# Patient Record
Sex: FEMALE | Race: WHITE | NOT HISPANIC OR LATINO | Employment: FULL TIME | URBAN - METROPOLITAN AREA
[De-identification: names, ages, dates, MRNs, and addresses within clinical notes are randomized per-mention and may not be internally consistent; named-entity substitution may affect disease eponyms.]

---

## 2017-04-10 ENCOUNTER — HOSPITAL ENCOUNTER (INPATIENT)
Facility: HOSPITAL | Age: 57
LOS: 2 days | Discharge: HOME | End: 2017-04-12
Attending: EMERGENCY MEDICINE | Admitting: INTERNAL MEDICINE

## 2017-04-10 ENCOUNTER — APPOINTMENT (OUTPATIENT)
Dept: GENERAL RADIOLOGY | Facility: HOSPITAL | Age: 57
End: 2017-04-10

## 2017-04-10 ENCOUNTER — APPOINTMENT (OUTPATIENT)
Dept: CT IMAGING | Facility: HOSPITAL | Age: 57
End: 2017-04-10

## 2017-04-10 DIAGNOSIS — F10.929 ALCOHOL INTOXICATION, WITH UNSPECIFIED COMPLICATION (HCC): ICD-10-CM

## 2017-04-10 DIAGNOSIS — T50.912A SUICIDE ATTEMPT BY MULTIPLE DRUG OVERDOSE, INITIAL ENCOUNTER (HCC): Primary | ICD-10-CM

## 2017-04-10 LAB
ALBUMIN SERPL-MCNC: 3.9 G/DL (ref 3.5–5.2)
ALBUMIN SERPL-MCNC: 3.9 G/DL (ref 3.5–5.2)
ALBUMIN/GLOB SERPL: 1.4 G/DL
ALBUMIN/GLOB SERPL: 1.4 G/DL
ALP SERPL-CCNC: 66 U/L (ref 39–117)
ALP SERPL-CCNC: 82 U/L (ref 39–117)
ALT SERPL W P-5'-P-CCNC: 27 U/L (ref 1–33)
ALT SERPL W P-5'-P-CCNC: 28 U/L (ref 1–33)
AMPHET+METHAMPHET UR QL: NEGATIVE
ANION GAP SERPL CALCULATED.3IONS-SCNC: 13.3 MMOL/L
ANION GAP SERPL CALCULATED.3IONS-SCNC: 7.4 MMOL/L
ANION GAP SERPL CALCULATED.3IONS-SCNC: 7.6 MMOL/L
APAP SERPL-MCNC: <5 MCG/ML (ref 10–30)
APTT PPP: 26.8 SECONDS (ref 22.7–35.4)
AST SERPL-CCNC: 27 U/L (ref 1–32)
AST SERPL-CCNC: 28 U/L (ref 1–32)
BACTERIA UR QL AUTO: ABNORMAL /HPF
BARBITURATES UR QL SCN: NEGATIVE
BASOPHILS # BLD AUTO: 0.01 10*3/MM3 (ref 0–0.2)
BASOPHILS # BLD AUTO: 0.03 10*3/MM3 (ref 0–0.2)
BASOPHILS NFR BLD AUTO: 0.2 % (ref 0–1.5)
BASOPHILS NFR BLD AUTO: 0.5 % (ref 0–1.5)
BENZODIAZ UR QL SCN: POSITIVE
BILIRUB SERPL-MCNC: 0.2 MG/DL (ref 0.1–1.2)
BILIRUB SERPL-MCNC: <0.2 MG/DL (ref 0.1–1.2)
BILIRUB UR QL STRIP: NEGATIVE
BUN BLD-MCNC: 15 MG/DL (ref 6–20)
BUN BLD-MCNC: 17 MG/DL (ref 6–20)
BUN BLD-MCNC: 20 MG/DL (ref 6–20)
BUN/CREAT SERPL: 20.3 (ref 7–25)
BUN/CREAT SERPL: 22.4 (ref 7–25)
BUN/CREAT SERPL: 27.4 (ref 7–25)
CALCIUM SPEC-SCNC: 8.5 MG/DL (ref 8.6–10.5)
CALCIUM SPEC-SCNC: 8.8 MG/DL (ref 8.6–10.5)
CALCIUM SPEC-SCNC: 9.3 MG/DL (ref 8.6–10.5)
CANNABINOIDS SERPL QL: NEGATIVE
CHLORIDE SERPL-SCNC: 100 MMOL/L (ref 98–107)
CHLORIDE SERPL-SCNC: 107 MMOL/L (ref 98–107)
CHLORIDE SERPL-SCNC: 108 MMOL/L (ref 98–107)
CLARITY UR: CLEAR
CO2 SERPL-SCNC: 26.7 MMOL/L (ref 22–29)
CO2 SERPL-SCNC: 29.4 MMOL/L (ref 22–29)
CO2 SERPL-SCNC: 29.6 MMOL/L (ref 22–29)
COCAINE UR QL: NEGATIVE
COLOR UR: YELLOW
CREAT BLD-MCNC: 0.73 MG/DL (ref 0.57–1)
CREAT BLD-MCNC: 0.74 MG/DL (ref 0.57–1)
CREAT BLD-MCNC: 0.76 MG/DL (ref 0.57–1)
DEPRECATED RDW RBC AUTO: 41.8 FL (ref 37–54)
DEPRECATED RDW RBC AUTO: 42.9 FL (ref 37–54)
EOSINOPHIL # BLD AUTO: 0.23 10*3/MM3 (ref 0–0.7)
EOSINOPHIL # BLD AUTO: 0.38 10*3/MM3 (ref 0–0.7)
EOSINOPHIL NFR BLD AUTO: 5.5 % (ref 0.3–6.2)
EOSINOPHIL NFR BLD AUTO: 6.3 % (ref 0.3–6.2)
ERYTHROCYTE [DISTWIDTH] IN BLOOD BY AUTOMATED COUNT: 12.3 % (ref 11.7–13)
ERYTHROCYTE [DISTWIDTH] IN BLOOD BY AUTOMATED COUNT: 12.5 % (ref 11.7–13)
ETHANOL BLD-MCNC: 113 MG/DL (ref 0–10)
ETHANOL UR QL: 0.11 %
GFR SERPL CREATININE-BSD FRML MDRD: 78 ML/MIN/1.73
GFR SERPL CREATININE-BSD FRML MDRD: 80 ML/MIN/1.73
GFR SERPL CREATININE-BSD FRML MDRD: 82 ML/MIN/1.73
GLOBULIN UR ELPH-MCNC: 2.8 GM/DL
GLOBULIN UR ELPH-MCNC: 2.8 GM/DL
GLUCOSE BLD-MCNC: 110 MG/DL (ref 65–99)
GLUCOSE BLD-MCNC: 84 MG/DL (ref 65–99)
GLUCOSE BLD-MCNC: 94 MG/DL (ref 65–99)
GLUCOSE BLDC GLUCOMTR-MCNC: 65 MG/DL (ref 70–130)
GLUCOSE BLDC GLUCOMTR-MCNC: 72 MG/DL (ref 70–130)
GLUCOSE BLDC GLUCOMTR-MCNC: 74 MG/DL (ref 70–130)
GLUCOSE BLDC GLUCOMTR-MCNC: 78 MG/DL (ref 70–130)
GLUCOSE BLDC GLUCOMTR-MCNC: 79 MG/DL (ref 70–130)
GLUCOSE BLDC GLUCOMTR-MCNC: 84 MG/DL (ref 70–130)
GLUCOSE BLDC GLUCOMTR-MCNC: 89 MG/DL (ref 70–130)
GLUCOSE BLDC GLUCOMTR-MCNC: 93 MG/DL (ref 70–130)
GLUCOSE UR STRIP-MCNC: NEGATIVE MG/DL
HCT VFR BLD AUTO: 40.4 % (ref 35.6–45.5)
HCT VFR BLD AUTO: 40.7 % (ref 35.6–45.5)
HGB BLD-MCNC: 12.9 G/DL (ref 11.9–15.5)
HGB BLD-MCNC: 13 G/DL (ref 11.9–15.5)
HGB UR QL STRIP.AUTO: NEGATIVE
HYALINE CASTS UR QL AUTO: ABNORMAL /LPF
IMM GRANULOCYTES # BLD: 0 10*3/MM3 (ref 0–0.03)
IMM GRANULOCYTES # BLD: 0 10*3/MM3 (ref 0–0.03)
IMM GRANULOCYTES NFR BLD: 0 % (ref 0–0.5)
IMM GRANULOCYTES NFR BLD: 0 % (ref 0–0.5)
INR PPP: 0.95 (ref 0.9–1.1)
KETONES UR QL STRIP: NEGATIVE
LEUKOCYTE ESTERASE UR QL STRIP.AUTO: ABNORMAL
LITHIUM SERPL-SCNC: 0.7 MMOL/L (ref 0.6–1.2)
LITHIUM SERPL-SCNC: 0.9 MMOL/L (ref 0.6–1.2)
LITHIUM SERPL-SCNC: 1.1 MMOL/L (ref 0.6–1.2)
LITHIUM SERPL-SCNC: 1.4 MMOL/L (ref 0.6–1.2)
LITHIUM SERPL-SCNC: 1.4 MMOL/L (ref 0.6–1.2)
LYMPHOCYTES # BLD AUTO: 1.47 10*3/MM3 (ref 0.9–4.8)
LYMPHOCYTES # BLD AUTO: 2.41 10*3/MM3 (ref 0.9–4.8)
LYMPHOCYTES NFR BLD AUTO: 34.8 % (ref 19.6–45.3)
LYMPHOCYTES NFR BLD AUTO: 40 % (ref 19.6–45.3)
MAGNESIUM SERPL-MCNC: 2.1 MG/DL (ref 1.6–2.6)
MAGNESIUM SERPL-MCNC: 2.1 MG/DL (ref 1.6–2.6)
MCH RBC QN AUTO: 29.9 PG (ref 26.9–32)
MCH RBC QN AUTO: 30.2 PG (ref 26.9–32)
MCHC RBC AUTO-ENTMCNC: 31.7 G/DL (ref 32.4–36.3)
MCHC RBC AUTO-ENTMCNC: 32.2 G/DL (ref 32.4–36.3)
MCV RBC AUTO: 93.7 FL (ref 80.5–98.2)
MCV RBC AUTO: 94.2 FL (ref 80.5–98.2)
METHADONE UR QL SCN: NEGATIVE
MONOCYTES # BLD AUTO: 0.31 10*3/MM3 (ref 0.2–1.2)
MONOCYTES # BLD AUTO: 0.68 10*3/MM3 (ref 0.2–1.2)
MONOCYTES NFR BLD AUTO: 11.3 % (ref 5–12)
MONOCYTES NFR BLD AUTO: 7.3 % (ref 5–12)
NEUTROPHILS # BLD AUTO: 2.2 10*3/MM3 (ref 1.9–8.1)
NEUTROPHILS # BLD AUTO: 2.53 10*3/MM3 (ref 1.9–8.1)
NEUTROPHILS NFR BLD AUTO: 41.9 % (ref 42.7–76)
NEUTROPHILS NFR BLD AUTO: 52.2 % (ref 42.7–76)
NITRITE UR QL STRIP: NEGATIVE
OPIATES UR QL: NEGATIVE
OXYCODONE UR QL SCN: NEGATIVE
PH UR STRIP.AUTO: 7 [PH] (ref 5–8)
PHOSPHATE SERPL-MCNC: 2.8 MG/DL (ref 2.5–4.5)
PLATELET # BLD AUTO: 194 10*3/MM3 (ref 140–500)
PLATELET # BLD AUTO: 209 10*3/MM3 (ref 140–500)
PMV BLD AUTO: 10.2 FL (ref 6–12)
PMV BLD AUTO: 10.4 FL (ref 6–12)
POTASSIUM BLD-SCNC: 3.6 MMOL/L (ref 3.5–5.2)
POTASSIUM BLD-SCNC: 4.2 MMOL/L (ref 3.5–5.2)
POTASSIUM BLD-SCNC: 4.4 MMOL/L (ref 3.5–5.2)
PROT SERPL-MCNC: 6.7 G/DL (ref 6–8.5)
PROT SERPL-MCNC: 6.7 G/DL (ref 6–8.5)
PROT UR QL STRIP: NEGATIVE
PROTHROMBIN TIME: 12.3 SECONDS (ref 11.7–14.2)
RBC # BLD AUTO: 4.31 10*6/MM3 (ref 3.9–5.2)
RBC # BLD AUTO: 4.32 10*6/MM3 (ref 3.9–5.2)
RBC # UR: ABNORMAL /HPF
REF LAB TEST METHOD: ABNORMAL
SALICYLATES SERPL-MCNC: <0.3 MG/DL
SODIUM BLD-SCNC: 140 MMOL/L (ref 136–145)
SODIUM BLD-SCNC: 144 MMOL/L (ref 136–145)
SODIUM BLD-SCNC: 145 MMOL/L (ref 136–145)
SP GR UR STRIP: 1.01 (ref 1–1.03)
SQUAMOUS #/AREA URNS HPF: ABNORMAL /HPF
UROBILINOGEN UR QL STRIP: ABNORMAL
WBC NRBC COR # BLD: 4.22 10*3/MM3 (ref 4.5–10.7)
WBC NRBC COR # BLD: 6.03 10*3/MM3 (ref 4.5–10.7)
WBC UR QL AUTO: ABNORMAL /HPF

## 2017-04-10 PROCEDURE — 80307 DRUG TEST PRSMV CHEM ANLYZR: CPT | Performed by: PHYSICIAN ASSISTANT

## 2017-04-10 PROCEDURE — 25010000002 ONDANSETRON PER 1 MG: Performed by: INTERNAL MEDICINE

## 2017-04-10 PROCEDURE — 83735 ASSAY OF MAGNESIUM: CPT | Performed by: PHYSICIAN ASSISTANT

## 2017-04-10 PROCEDURE — 0HQ0XZZ REPAIR SCALP SKIN, EXTERNAL APPROACH: ICD-10-PCS | Performed by: PHYSICIAN ASSISTANT

## 2017-04-10 PROCEDURE — 85025 COMPLETE CBC W/AUTO DIFF WBC: CPT | Performed by: PHYSICIAN ASSISTANT

## 2017-04-10 PROCEDURE — 87086 URINE CULTURE/COLONY COUNT: CPT | Performed by: PHYSICIAN ASSISTANT

## 2017-04-10 PROCEDURE — 72050 X-RAY EXAM NECK SPINE 4/5VWS: CPT

## 2017-04-10 PROCEDURE — 80178 ASSAY OF LITHIUM: CPT | Performed by: EMERGENCY MEDICINE

## 2017-04-10 PROCEDURE — 93005 ELECTROCARDIOGRAM TRACING: CPT | Performed by: PHYSICIAN ASSISTANT

## 2017-04-10 PROCEDURE — 80053 COMPREHEN METABOLIC PANEL: CPT | Performed by: PHYSICIAN ASSISTANT

## 2017-04-10 PROCEDURE — 80053 COMPREHEN METABOLIC PANEL: CPT | Performed by: INTERNAL MEDICINE

## 2017-04-10 PROCEDURE — 85730 THROMBOPLASTIN TIME PARTIAL: CPT | Performed by: PHYSICIAN ASSISTANT

## 2017-04-10 PROCEDURE — 71020 HC CHEST PA AND LATERAL: CPT

## 2017-04-10 PROCEDURE — 80178 ASSAY OF LITHIUM: CPT | Performed by: PHYSICIAN ASSISTANT

## 2017-04-10 PROCEDURE — 85610 PROTHROMBIN TIME: CPT | Performed by: PHYSICIAN ASSISTANT

## 2017-04-10 PROCEDURE — 99285 EMERGENCY DEPT VISIT HI MDM: CPT

## 2017-04-10 PROCEDURE — 90791 PSYCH DIAGNOSTIC EVALUATION: CPT

## 2017-04-10 PROCEDURE — 81001 URINALYSIS AUTO W/SCOPE: CPT | Performed by: PHYSICIAN ASSISTANT

## 2017-04-10 PROCEDURE — 85025 COMPLETE CBC W/AUTO DIFF WBC: CPT | Performed by: INTERNAL MEDICINE

## 2017-04-10 PROCEDURE — 70450 CT HEAD/BRAIN W/O DYE: CPT

## 2017-04-10 PROCEDURE — 83735 ASSAY OF MAGNESIUM: CPT | Performed by: INTERNAL MEDICINE

## 2017-04-10 PROCEDURE — 82962 GLUCOSE BLOOD TEST: CPT

## 2017-04-10 PROCEDURE — 93010 ELECTROCARDIOGRAM REPORT: CPT | Performed by: INTERNAL MEDICINE

## 2017-04-10 PROCEDURE — 84100 ASSAY OF PHOSPHORUS: CPT | Performed by: INTERNAL MEDICINE

## 2017-04-10 RX ORDER — ACETAMINOPHEN 325 MG/1
650 TABLET ORAL EVERY 4 HOURS PRN
Status: DISCONTINUED | OUTPATIENT
Start: 2017-04-10 | End: 2017-04-12 | Stop reason: HOSPADM

## 2017-04-10 RX ORDER — TRAMADOL HYDROCHLORIDE 50 MG/1
50 TABLET ORAL EVERY 6 HOURS PRN
Status: DISCONTINUED | OUTPATIENT
Start: 2017-04-10 | End: 2017-04-12 | Stop reason: HOSPADM

## 2017-04-10 RX ORDER — POLYETHYLENE GLYCOL 3350, SODIUM CHLORIDE, POTASSIUM CHLORIDE, SODIUM BICARBONATE, AND SODIUM SULFATE 240; 5.84; 2.98; 6.72; 22.72 G/4L; G/4L; G/4L; G/4L; G/4L
1000 POWDER, FOR SOLUTION ORAL
Status: COMPLETED | OUTPATIENT
Start: 2017-04-10 | End: 2017-04-10

## 2017-04-10 RX ORDER — MORPHINE SULFATE 2 MG/ML
1 INJECTION, SOLUTION INTRAMUSCULAR; INTRAVENOUS EVERY 4 HOURS PRN
Status: DISCONTINUED | OUTPATIENT
Start: 2017-04-10 | End: 2017-04-12 | Stop reason: HOSPADM

## 2017-04-10 RX ORDER — POTASSIUM CHLORIDE 7.45 MG/ML
10 INJECTION INTRAVENOUS
Status: DISCONTINUED | OUTPATIENT
Start: 2017-04-10 | End: 2017-04-12 | Stop reason: HOSPADM

## 2017-04-10 RX ORDER — NITROGLYCERIN 0.4 MG/1
0.4 TABLET SUBLINGUAL
Status: DISCONTINUED | OUTPATIENT
Start: 2017-04-10 | End: 2017-04-12 | Stop reason: HOSPADM

## 2017-04-10 RX ORDER — DEXTROSE MONOHYDRATE 25 G/50ML
25 INJECTION, SOLUTION INTRAVENOUS
Status: DISCONTINUED | OUTPATIENT
Start: 2017-04-10 | End: 2017-04-12 | Stop reason: HOSPADM

## 2017-04-10 RX ORDER — NALOXONE HCL 0.4 MG/ML
0.4 VIAL (ML) INJECTION
Status: DISCONTINUED | OUTPATIENT
Start: 2017-04-10 | End: 2017-04-12 | Stop reason: HOSPADM

## 2017-04-10 RX ORDER — SODIUM CHLORIDE 9 MG/ML
175 INJECTION, SOLUTION INTRAVENOUS CONTINUOUS
Status: DISCONTINUED | OUTPATIENT
Start: 2017-04-10 | End: 2017-04-11

## 2017-04-10 RX ORDER — SODIUM CHLORIDE 0.9 % (FLUSH) 0.9 %
1-10 SYRINGE (ML) INJECTION AS NEEDED
Status: DISCONTINUED | OUTPATIENT
Start: 2017-04-10 | End: 2017-04-12 | Stop reason: HOSPADM

## 2017-04-10 RX ORDER — LITHIUM CARBONATE 150 MG/1
150 CAPSULE ORAL 2 TIMES DAILY WITH MEALS
COMMUNITY
End: 2017-04-12 | Stop reason: HOSPADM

## 2017-04-10 RX ORDER — SENNA AND DOCUSATE SODIUM 50; 8.6 MG/1; MG/1
2 TABLET, FILM COATED ORAL 2 TIMES DAILY PRN
Status: DISCONTINUED | OUTPATIENT
Start: 2017-04-10 | End: 2017-04-12 | Stop reason: HOSPADM

## 2017-04-10 RX ORDER — ONDANSETRON 2 MG/ML
4 INJECTION INTRAMUSCULAR; INTRAVENOUS EVERY 6 HOURS PRN
Status: DISCONTINUED | OUTPATIENT
Start: 2017-04-10 | End: 2017-04-12 | Stop reason: HOSPADM

## 2017-04-10 RX ORDER — IPRATROPIUM BROMIDE AND ALBUTEROL SULFATE 2.5; .5 MG/3ML; MG/3ML
3 SOLUTION RESPIRATORY (INHALATION)
Status: DISCONTINUED | OUTPATIENT
Start: 2017-04-10 | End: 2017-04-12 | Stop reason: HOSPADM

## 2017-04-10 RX ORDER — POTASSIUM CHLORIDE 750 MG/1
40 CAPSULE, EXTENDED RELEASE ORAL AS NEEDED
Status: DISCONTINUED | OUTPATIENT
Start: 2017-04-10 | End: 2017-04-12 | Stop reason: HOSPADM

## 2017-04-10 RX ORDER — POTASSIUM CHLORIDE 1.5 G/1.77G
40 POWDER, FOR SOLUTION ORAL AS NEEDED
Status: DISCONTINUED | OUTPATIENT
Start: 2017-04-10 | End: 2017-04-10 | Stop reason: CLARIF

## 2017-04-10 RX ORDER — CLONAZEPAM 1 MG/1
1 TABLET ORAL
COMMUNITY
End: 2017-04-12 | Stop reason: HOSPADM

## 2017-04-10 RX ORDER — SODIUM CHLORIDE 0.9 % (FLUSH) 0.9 %
10 SYRINGE (ML) INJECTION AS NEEDED
Status: DISCONTINUED | OUTPATIENT
Start: 2017-04-10 | End: 2017-04-12 | Stop reason: HOSPADM

## 2017-04-10 RX ORDER — NICOTINE POLACRILEX 4 MG
15 LOZENGE BUCCAL
Status: DISCONTINUED | OUTPATIENT
Start: 2017-04-10 | End: 2017-04-12 | Stop reason: HOSPADM

## 2017-04-10 RX ORDER — SENNA AND DOCUSATE SODIUM 50; 8.6 MG/1; MG/1
2 TABLET, FILM COATED ORAL NIGHTLY
Status: DISCONTINUED | OUTPATIENT
Start: 2017-04-10 | End: 2017-04-12 | Stop reason: HOSPADM

## 2017-04-10 RX ADMIN — TRAMADOL HYDROCHLORIDE 50 MG: 50 TABLET, COATED ORAL at 16:58

## 2017-04-10 RX ADMIN — POLYETHYLENE GLYCOL 3350, SODIUM CHLORIDE, POTASSIUM CHLORIDE, SODIUM BICARBONATE, AND SODIUM SULFATE 1000 ML: 240; 5.84; 2.98; 6.72; 22.72 POWDER, FOR SOLUTION ORAL at 04:45

## 2017-04-10 RX ADMIN — POLYETHYLENE GLYCOL 3350, SODIUM CHLORIDE, POTASSIUM CHLORIDE, SODIUM BICARBONATE, AND SODIUM SULFATE 1000 ML: 240; 5.84; 2.98; 6.72; 22.72 POWDER, FOR SOLUTION ORAL at 03:30

## 2017-04-10 RX ADMIN — POLYETHYLENE GLYCOL 3350, SODIUM CHLORIDE, POTASSIUM CHLORIDE, SODIUM BICARBONATE, AND SODIUM SULFATE 1000 ML: 240; 5.84; 2.98; 6.72; 22.72 POWDER, FOR SOLUTION ORAL at 02:30

## 2017-04-10 RX ADMIN — POLYETHYLENE GLYCOL 3350, SODIUM CHLORIDE, POTASSIUM CHLORIDE, SODIUM BICARBONATE, AND SODIUM SULFATE 1000 ML: 240; 5.84; 2.98; 6.72; 22.72 POWDER, FOR SOLUTION ORAL at 06:26

## 2017-04-10 RX ADMIN — SODIUM CHLORIDE 175 ML/HR: 9 INJECTION, SOLUTION INTRAVENOUS at 05:28

## 2017-04-10 RX ADMIN — SODIUM CHLORIDE 175 ML/HR: 9 INJECTION, SOLUTION INTRAVENOUS at 12:04

## 2017-04-10 RX ADMIN — ACETAMINOPHEN 650 MG: 325 TABLET ORAL at 18:32

## 2017-04-10 RX ADMIN — ONDANSETRON 4 MG: 2 INJECTION, SOLUTION INTRAMUSCULAR; INTRAVENOUS at 05:28

## 2017-04-10 RX ADMIN — SODIUM CHLORIDE 1000 ML: 9 INJECTION, SOLUTION INTRAVENOUS at 10:56

## 2017-04-10 RX ADMIN — SODIUM CHLORIDE 175 ML/HR: 9 INJECTION, SOLUTION INTRAVENOUS at 21:08

## 2017-04-10 RX ADMIN — SODIUM CHLORIDE 175 ML/HR: 9 INJECTION, SOLUTION INTRAVENOUS at 18:25

## 2017-04-10 RX ADMIN — SODIUM CHLORIDE 1000 ML: 9 INJECTION, SOLUTION INTRAVENOUS at 02:10

## 2017-04-10 RX ADMIN — DEXTROSE MONOHYDRATE 25 G: 25 INJECTION, SOLUTION INTRAVENOUS at 12:31

## 2017-04-10 NOTE — ED PROVIDER NOTES
I supervised care provided by the midlevel provider.    We have discussed this patient's history, physical exam, and treatment plan.   I have reviewed the note and personally saw and examined the patient and agree with the plan of care.      Pt reports that at about 23:00 tonight, pt attempted to overdose on her own prescribed medications as she has recently been depressed. Pt states that she took Clonazepam 1 mg (40 pills) and Lithium 150 mg (20 capsules) with ETOH. Pt then called the police after ingestion and reports that when she opened the door, she fell, sustaining injury to the head. She denies LOC.     On physical exam, pt is oriented. She is sleepy, but rouses easily to verbal stimuli..She is able to answer most questions appropriately.   Pupils are round and reactive to light bilaterally.  Pt with kerlex bandage over 3cm scalp wound with good hemostasis  Lungs good air movement bilat,  Abdomen nonTTPalp        EKG:      EKG time: 02:57 AM  Rhythm/Rate: NSR rate 60s  P waves and HI: Normal P waves. Normal GERSON  QRS, axis: Normal QRS   ST and T waves: Normal ST  QT is normal     Interpreted Contemporaneously by me, independently viewed  No old EKG available for comparison          LABS:  Lab Results (last 24 hours)     Procedure Component Value Units Date/Time    CBC & Differential [[id number]] Collected:  04/10/17 0209    Specimen:  Blood Updated:  04/10/17 0225    Narrative:       The following orders were created for panel order CBC & Differential.  Procedure                               Abnormality         Status                     ---------                               -----------         ------                     CBC Auto Differential[[id number]]         Abnormal            Final result                 Please view results for these tests on the individual orders.    Comprehensive Metabolic Panel [[id number]]  (Abnormal) Collected:  04/10/17 0209    Specimen:  Blood Updated:  04/10/17 0255     Glucose 110  (H) mg/dL      BUN 20 mg/dL      Creatinine 0.73 mg/dL      Sodium 140 mmol/L      Potassium 3.6 mmol/L      Chloride 100 mmol/L      CO2 26.7 mmol/L      Calcium 9.3 mg/dL      Total Protein 6.7 g/dL      Albumin 3.90 g/dL      ALT (SGPT) 28 U/L      AST (SGOT) 28 U/L      Alkaline Phosphatase 82 U/L      Total Bilirubin <0.2 mg/dL      eGFR Non African Amer 82 mL/min/1.73      Globulin 2.8 gm/dL      A/G Ratio 1.4 g/dL      BUN/Creatinine Ratio 27.4 (H)     Anion Gap 13.3 mmol/L     Protime-INR [[id number]]  (Normal) Collected:  04/10/17 0209    Specimen:  Blood Updated:  04/10/17 0235     Protime 12.3 Seconds      INR 0.95    aPTT [[id number]]  (Normal) Collected:  04/10/17 0209    Specimen:  Blood Updated:  04/10/17 0235     PTT 26.8 seconds     Ethanol [[id number]]  (Abnormal) Collected:  04/10/17 0209    Specimen:  Blood Updated:  04/10/17 0247     Ethanol 113 (H) mg/dL      Ethanol % 0.113 %     Magnesium [[id number]]  (Normal) Collected:  04/10/17 0209    Specimen:  Blood Updated:  04/10/17 0247     Magnesium 2.1 mg/dL     Acetaminophen Level [[id number]]  (Abnormal) Collected:  04/10/17 0209    Specimen:  Blood Updated:  04/10/17 0247     Acetaminophen <5.0 (L) mcg/mL     Salicylate Level [[id number]] Collected:  04/10/17 0209    Specimen:  Blood Updated:  04/10/17 0247     Salicylate <0.3 mg/dL     Narrative:       Therapeutic range for Salicylates:  3.0 - 10.0 mg/dL for antipyretic/analgesic conditions  15.0 - 30.0 mg/dL for anti-inflammatory conditions    Lithium Level [[id number]]  (Normal) Collected:  04/10/17 0209    Specimen:  Blood Updated:  04/10/17 0244     Lithium 0.7 mmol/L     CBC Auto Differential [[id number]]  (Abnormal) Collected:  04/10/17 0209    Specimen:  Blood Updated:  04/10/17 0225     WBC 6.03 10*3/mm3      RBC 4.31 10*6/mm3      Hemoglobin 13.0 g/dL      Hematocrit 40.4 %      MCV 93.7 fL      MCH 30.2 pg      MCHC 32.2 (L) g/dL      RDW 12.3 %      RDW-SD 41.8 fl      MPV 10.2 fL      Platelets  209 10*3/mm3      Neutrophil % 41.9 (L) %      Lymphocyte % 40.0 %      Monocyte % 11.3 %      Eosinophil % 6.3 (H) %      Basophil % 0.5 %      Immature Grans % 0.0 %      Neutrophils, Absolute 2.53 10*3/mm3      Lymphocytes, Absolute 2.41 10*3/mm3      Monocytes, Absolute 0.68 10*3/mm3      Eosinophils, Absolute 0.38 10*3/mm3      Basophils, Absolute 0.03 10*3/mm3      Immature Grans, Absolute 0.00 10*3/mm3     Lithium Level [[id number]]  (Abnormal) Collected:  04/10/17 0406    Specimen:  Blood Updated:  04/10/17 0504     Lithium 1.4 (H) mmol/L     Urinalysis With / Culture If Indicated [[id number]]  (Abnormal) Collected:  04/10/17 0410    Specimen:  Urine from Urine, Clean Catch Updated:  04/10/17 0429     Color, UA Yellow     Appearance, UA Clear     pH, UA 7.0     Specific Gravity, UA 1.008     Glucose, UA Negative     Ketones, UA Negative     Bilirubin, UA Negative     Blood, UA Negative     Protein, UA Negative     Leuk Esterase, UA Small (1+) (A)     Nitrite, UA Negative     Urobilinogen, UA 0.2 E.U./dL    Urine Drug Screen [[id number]]  (Abnormal) Collected:  04/10/17 0410    Specimen:  Urine from Urine, Clean Catch Updated:  04/10/17 0449     Amphet/Methamphet, Screen Negative     Barbiturates Screen, Urine Negative     Benzodiazepine Screen, Urine Positive (A)     Cocaine Screen, Urine Negative     Opiate Screen Negative     THC, Screen, Urine Negative     Methadone Screen, Urine Negative     Oxycodone Screen, Urine Negative    Narrative:       Negative Thresholds For Drugs Screened:     Amphetamines               500 ng/ml   Barbiturates               200 ng/ml   Benzodiazepines            100 ng/ml   Cocaine                    300 ng/ml   Methadone                  300 ng/ml   Opiates                    300 ng/ml   Oxycodone                  100 ng/ml   THC                        50 ng/ml    The Normal Value for all drugs tested is negative. This report includes final unconfirmed screening results to be used  for medical treatment purposes only. Unconfirmed results must not be used for non-medical purposes such as employment or legal testing. Clinical consideration should be applied to any drug of abuse test, particulary when unconfirmed results are used.    Urinalysis, Microscopic Only [[id number]]  (Abnormal) Collected:  04/10/17 0410    Specimen:  Urine from Urine, Clean Catch Updated:  04/10/17 0429     RBC, UA 0-2 /HPF      WBC, UA 3-5 (A) /HPF      Bacteria, UA None Seen /HPF      Squamous Epithelial Cells, UA 0-2 /HPF      Hyaline Casts, UA 0-2 /LPF      Methodology Automated Microscopy    Urine Culture [[id number]] Collected:  04/10/17 0410    Specimen:  Urine from Urine, Clean Catch Updated:  04/10/17 0425    POC Glucose Fingerstick [[id number]]  (Normal) Collected:  04/10/17 0619    Specimen:  Blood Updated:  04/10/17 0621     Glucose 89 mg/dL     Narrative:       Meter: [id number] : [id number]    Lithium Level [[id number]]  (Abnormal) Collected:  04/10/17 0623    Specimen:  Blood Updated:  04/10/17 0703     Lithium 1.4 (H) mmol/L     CBC & Differential [[id number]] Collected:  04/10/17 0623    Specimen:  Blood Updated:  04/10/17 0652    Narrative:       The following orders were created for panel order CBC & Differential.  Procedure                               Abnormality         Status                     ---------                               -----------         ------                     CBC Auto Differential[[id number]]         Abnormal            Final result                 Please view results for these tests on the individual orders.    Comprehensive Metabolic Panel [[id number]]  (Abnormal) Collected:  04/10/17 0623    Specimen:  Blood Updated:  04/10/17 0710     Glucose 94 mg/dL      BUN 17 mg/dL      Creatinine 0.76 mg/dL      Sodium 144 mmol/L      Potassium 4.2 mmol/L      Chloride 107 mmol/L      CO2 29.6 (H) mmol/L      Calcium 8.8 mg/dL      Total Protein 6.7 g/dL      Albumin 3.90 g/dL      ALT (SGPT)  27 U/L      AST (SGOT) 27 U/L      Alkaline Phosphatase 66 U/L      Total Bilirubin 0.2 mg/dL      eGFR Non African Amer 78 mL/min/1.73      Globulin 2.8 gm/dL      A/G Ratio 1.4 g/dL      BUN/Creatinine Ratio 22.4     Anion Gap 7.4 mmol/L     CBC Auto Differential [[id number]]  (Abnormal) Collected:  04/10/17 0623    Specimen:  Blood Updated:  04/10/17 0652     WBC 4.22 (L) 10*3/mm3      RBC 4.32 10*6/mm3      Hemoglobin 12.9 g/dL      Hematocrit 40.7 %      MCV 94.2 fL      MCH 29.9 pg      MCHC 31.7 (L) g/dL      RDW 12.5 %      RDW-SD 42.9 fl      MPV 10.4 fL      Platelets 194 10*3/mm3      Neutrophil % 52.2 %      Lymphocyte % 34.8 %      Monocyte % 7.3 %      Eosinophil % 5.5 %      Basophil % 0.2 %      Immature Grans % 0.0 %      Neutrophils, Absolute 2.20 10*3/mm3      Lymphocytes, Absolute 1.47 10*3/mm3      Monocytes, Absolute 0.31 10*3/mm3      Eosinophils, Absolute 0.23 10*3/mm3      Basophils, Absolute 0.01 10*3/mm3      Immature Grans, Absolute 0.00 10*3/mm3                     PROGRESS NOTES:    1:39 AM: Discussed case with Poison Control. She states that symptoms for toxic levels of lithium include confusion, somnolence, tremor, respiratory depression, nausea, and vomiting. Cardiac symptoms include low BP, V-tach, and increased HR. It is not affected by charcoal, thus, whole-bowel irrigation is recommended. If toxic levels are reached, dialysis is recommended. It is also recommended that pt's lithium levels are checked every 2 hours until the level decreases.     1:58 AM: Pt is receiving IV fluids. She will have NG tube placed (golytely ordered). Pt will also be placed on a 72-hour hold.     3:01 AM: Rechecked pt. Pt has NG tube in place, which she is tolerating well. On re-evaluation, pt is arousable to verbal stimuli. O2 sat is 98% on room air. BP is 114/81. HR is in the 70s.     3:10 AM: Discussed case with Dr. Wood Cheung, intensivist. He will admit pt to the ICU. Head laceration was repaired by  Mr. Alfredo PA-C. Pt's CT Head is negative acute.         DISPOSITION: Pt admitted to the ICU.          Final diagnoses:   Suicide attempt by multiple drug overdose, initial encounter   Alcohol intoxication, with unspecified complication   fall  Scalp laceration    Documentation assistance provided by Aiden Jacobo. Information recorded by the scribe was done at my direction and has been verified and validated by me.     Entered by Aiden Jacobo, acting as scribe for Dr. Obed MD.                   Aiden Jacobo  04/10/17 0718       Virginia Clay MD  04/10/17 0964

## 2017-04-10 NOTE — PLAN OF CARE
Problem: Fall Risk (Adult)  Goal: Identify Related Risk Factors and Signs and Symptoms  Outcome: Outcome(s) achieved Date Met:  04/10/17  Goal: Absence of Falls  Outcome: Ongoing (interventions implemented as appropriate)    Problem: Suicide Risk (Adult,Obstetrics,Pediatric)  Goal: Identify Related Risk Factors and Signs and Symptoms  Outcome: Outcome(s) achieved Date Met:  04/10/17  Goal: Strength-Based Wellness/Recovery  Outcome: Ongoing (interventions implemented as appropriate)  Goal: Physical Safety  Outcome: Ongoing (interventions implemented as appropriate)    Problem: Overdose, Ingestion/Inhalants (Adult)  Goal: Signs and Symptoms of Listed Potential Problems Will be Absent or Manageable (Overdose, Ingestion/Inhalants)  Outcome: Ongoing (interventions implemented as appropriate)    Problem: Patient Care Overview (Adult)  Goal: Plan of Care Review  Outcome: Ongoing (interventions implemented as appropriate)    04/10/17 1951   Coping/Psychosocial Response Interventions   Plan Of Care Reviewed With patient   Patient Care Overview   Progress improving   Outcome Evaluation   Outcome Summary/Follow up Plan Denies SI at this time. VSS. Medicated x2 for c/o HA. Sitter at bedside. Monitor labs. No acute distress at this time.

## 2017-04-10 NOTE — H&P
[facility]    CC: overdose    HPI:  Mrs. Blankenship is a 56yo WF with a history of depression and anorexia.  She presents to the ER today after taking 20 lithium tablets, clonazepam and alcohol in a suicide attempt.  She says she no longer feels suicidal.  She did strike her head as well.  She is drowsy but arouses easily and answers questions appropriately.    Past Medical History:   Diagnosis Date   • Anorexia    • Anxiety    • Bulimia    • Depression      Social History     Social History   • Marital status: Single     Spouse name: N/A   • Number of children: N/A   • Years of education: N/A     Social History Main Topics   • Smoking status: None   • Smokeless tobacco: None   • Alcohol use Yes   • Drug use: None   • Sexual activity: Not Asked     Other Topics Concern   • None     Social History Narrative   • None     History reviewed. No pertinent family history.  MEDS: reviewed  ALL: NKDA  ROS:  Constitutional: Negative for fever.   HENT: Negative for sore throat.   Eyes: Negative.   Respiratory: Negative for cough and shortness of breath.   Cardiovascular: Negative for chest pain.   Gastrointestinal: Negative for abdominal pain, diarrhea and vomiting.   Genitourinary: Negative for dysuria.   Musculoskeletal: Negative for neck pain.   Skin: Negative for rash.   Allergic/Immunologic: Negative.   Neurological: Positive for dizziness. Negative for weakness, numbness and headaches.   Hematological: Negative.   Psychiatric/Behavioral: Positive for suicidal ideas.   Drug overdose    All other systems reviewed and are negative.     Vital Sign Min/Max for last 24 hours  Temp  Min: 97.6 °F (36.4 °C)  Max: 97.6 °F (36.4 °C)   BP  Min: 90/70  Max: 114/81   Pulse  Min: 69  Max: 71   Resp  Min: 14  Max: 14   SpO2  Min: 96 %  Max: 98 %   No Data Recorded   Weight  Min: 130 lb (59 kg)  Max: 130 lb (59 kg)     GEN:  NAD, appears ill, obese, AxOx3  HEENT: PERRL, EOMI, no icterus, mmm, no jvd, trachea midline, neck  supple  CHEST: CTA bilat, no wheezes, no crackles, no use of accessory muscles  CV: RRR, no m/g/r  ABD: soft, nt, nd +bs, no hepatosplenomegaly  EXT: no c/c/e  SKIN: no rashes, no xanthomas, nl turgor  LYMPH: no palpable cervical or supraclavicular lymphadenopathy  NEURO: CN 2-12 intact and symmetric bilaterally  PSYCH: nl affect, nl orientation, nl judgement, nl mood  MSK: No kyphoscoliosis, 5/5 strenght ue and le bilaterally    Labs:  Bmp: normal  Ethanol 0.113  Lithium 0.7  Wbc 6  hgb 13  plts 209    Ct head: no acute disease  CXR: NAD    A/P:  1. Lithium overdose -- she will require neurochecks and frequent lithium levels, every two hours till at least noon today.  2. Etoh abuse -- thiamine, supportive care, watch for signs of withdrawal  3. Suicide attempt -- will need access center to see  4. Depression  5. Scalp laceration     CC 35 mins

## 2017-04-10 NOTE — ED PROVIDER NOTES
EMERGENCY DEPARTMENT ENCOUNTER    CHIEF COMPLAINT  Chief Complaint: SI and drug overdose   History given by: patient   History limited by: nothing   Room Number: 06/06  PMD: No Known Provider      HPI:  Pt is a 57 y.o. female who presents complaining of SI with overdose tonight around 2300. Pt took 40 2 mg Clonazepam and roughly 30 Lithium (dosage unknown). Pt also used EtOH tonight. When police arrived at her home, pt fell backwards while opening the door and sustained a head laceration. Pt now complains of dizziness but yunier all other sx at this time. Her tetanus is up to date.     Onset: around 2300  Timing: constant  Location: head, generalized   Radiation: does not radiate   Quality: new  Intensity/Severity: moderate   Progression: unchanged   Associated Symptoms: dizziness  Aggravating Factors: none specified   Alleviating Factors: none specified   Previous Episodes: none specified   Treatment before arrival: none specified     PAST MEDICAL HISTORY  Active Ambulatory Problems     Diagnosis Date Noted   • No Active Ambulatory Problems     Resolved Ambulatory Problems     Diagnosis Date Noted   • No Resolved Ambulatory Problems     Past Medical History:   Diagnosis Date   • Anorexia    • Anxiety    • Bulimia    • Depression        PAST SURGICAL HISTORY  History reviewed. No pertinent surgical history.    FAMILY HISTORY  History reviewed. No pertinent family history.    SOCIAL HISTORY  Social History     Social History   • Marital status: Single     Spouse name: N/A   • Number of children: N/A   • Years of education: N/A     Occupational History   • Not on file.     Social History Main Topics   • Smoking status: Not on file   • Smokeless tobacco: Not on file   • Alcohol use Yes   • Drug use: Not on file   • Sexual activity: Not on file     Other Topics Concern   • Not on file     Social History Narrative   • No narrative on file       ALLERGIES  Review of patient's allergies indicates no known  allergies.    REVIEW OF SYSTEMS  Review of Systems   Constitutional: Negative for fever.   HENT: Negative for sore throat.    Eyes: Negative.    Respiratory: Negative for cough and shortness of breath.    Cardiovascular: Negative for chest pain.   Gastrointestinal: Negative for abdominal pain, diarrhea and vomiting.   Genitourinary: Negative for dysuria.   Musculoskeletal: Negative for neck pain.   Skin: Negative for rash.   Allergic/Immunologic: Negative.    Neurological: Positive for dizziness. Negative for weakness, numbness and headaches.   Hematological: Negative.    Psychiatric/Behavioral: Positive for suicidal ideas.        Drug overdose    All other systems reviewed and are negative.      PHYSICAL EXAM  ED Triage Vitals   Temp Heart Rate Resp BP SpO2   04/10/17 0051 04/10/17 0051 04/10/17 0051 04/10/17 0051 04/10/17 0051   97.6 °F (36.4 °C) 70 14 90/70 97 %      Temp src Heart Rate Source Patient Position BP Location FiO2 (%)   -- -- -- -- --                Physical Exam   Constitutional: She is oriented to person, place, and time.   Smells of alcohol and appears intoxicated, drowsy but easily aroused by voice.   HENT:   Head: Head is with laceration (3 cm posterior scalp).   Eyes: EOM are normal. Pupils are equal, round, and reactive to light.   Neck: Normal range of motion.   No C-spine tenderness.   Cardiovascular: Normal rate and regular rhythm.    Pulmonary/Chest: Effort normal and breath sounds normal. No respiratory distress. She exhibits no tenderness.   Abdominal: Soft. There is no tenderness.   Musculoskeletal: Normal range of motion. She exhibits no deformity.   Neurological: She is alert and oriented to person, place, and time. She has normal sensation and normal strength.   Skin: Skin is warm and dry.   Psychiatric: She expresses suicidal ideation. She expresses suicidal plans. She has a flat affect.   Nursing note and vitals reviewed.      LAB RESULTS  Lab Results (last 24 hours)     Procedure  Component Value Units Date/Time    CBC & Differential [[id number]] Collected:  04/10/17 0209    Specimen:  Blood Updated:  04/10/17 0225    Narrative:       The following orders were created for panel order CBC & Differential.  Procedure                               Abnormality         Status                     ---------                               -----------         ------                     CBC Auto Differential[[id number]]         Abnormal            Final result                 Please view results for these tests on the individual orders.    Comprehensive Metabolic Panel [[id number]]  (Abnormal) Collected:  04/10/17 0209    Specimen:  Blood Updated:  04/10/17 0255     Glucose 110 (H) mg/dL      BUN 20 mg/dL      Creatinine 0.73 mg/dL      Sodium 140 mmol/L      Potassium 3.6 mmol/L      Chloride 100 mmol/L      CO2 26.7 mmol/L      Calcium 9.3 mg/dL      Total Protein 6.7 g/dL      Albumin 3.90 g/dL      ALT (SGPT) 28 U/L      AST (SGOT) 28 U/L      Alkaline Phosphatase 82 U/L      Total Bilirubin <0.2 mg/dL      eGFR Non African Amer 82 mL/min/1.73      Globulin 2.8 gm/dL      A/G Ratio 1.4 g/dL      BUN/Creatinine Ratio 27.4 (H)     Anion Gap 13.3 mmol/L     Protime-INR [[id number]]  (Normal) Collected:  04/10/17 0209    Specimen:  Blood Updated:  04/10/17 0235     Protime 12.3 Seconds      INR 0.95    aPTT [[id number]]  (Normal) Collected:  04/10/17 0209    Specimen:  Blood Updated:  04/10/17 0235     PTT 26.8 seconds     Ethanol [[id number]]  (Abnormal) Collected:  04/10/17 0209    Specimen:  Blood Updated:  04/10/17 0247     Ethanol 113 (H) mg/dL      Ethanol % 0.113 %     Magnesium [[id number]]  (Normal) Collected:  04/10/17 0209    Specimen:  Blood Updated:  04/10/17 0247     Magnesium 2.1 mg/dL     Acetaminophen Level [[id number]]  (Abnormal) Collected:  04/10/17 0209    Specimen:  Blood Updated:  04/10/17 0247     Acetaminophen <5.0 (L) mcg/mL     Salicylate Level [[id number]] Collected:  04/10/17 0209    Specimen:   Blood Updated:  04/10/17 0247     Salicylate <0.3 mg/dL     Narrative:       Therapeutic range for Salicylates:  3.0 - 10.0 mg/dL for antipyretic/analgesic conditions  15.0 - 30.0 mg/dL for anti-inflammatory conditions    Lithium Level [[id number]]  (Normal) Collected:  04/10/17 0209    Specimen:  Blood Updated:  04/10/17 0244     Lithium 0.7 mmol/L     CBC Auto Differential [[id number]]  (Abnormal) Collected:  04/10/17 0209    Specimen:  Blood Updated:  04/10/17 0225     WBC 6.03 10*3/mm3      RBC 4.31 10*6/mm3      Hemoglobin 13.0 g/dL      Hematocrit 40.4 %      MCV 93.7 fL      MCH 30.2 pg      MCHC 32.2 (L) g/dL      RDW 12.3 %      RDW-SD 41.8 fl      MPV 10.2 fL      Platelets 209 10*3/mm3      Neutrophil % 41.9 (L) %      Lymphocyte % 40.0 %      Monocyte % 11.3 %      Eosinophil % 6.3 (H) %      Basophil % 0.5 %      Immature Grans % 0.0 %      Neutrophils, Absolute 2.53 10*3/mm3      Lymphocytes, Absolute 2.41 10*3/mm3      Monocytes, Absolute 0.68 10*3/mm3      Eosinophils, Absolute 0.38 10*3/mm3      Basophils, Absolute 0.03 10*3/mm3      Immature Grans, Absolute 0.00 10*3/mm3           I ordered the above labs and reviewed the results    RADIOLOGY  XR Chest 2 View    (Results Pending)   CT Head Without Contrast    (Results Pending)   XR Spine Cervical Complete 4 or 5 View    (Results Pending)      CT Head is negative.     CXR is negative.     XR cervical spine shows degenerative changes but no acute fracture.     I ordered the above noted radiological studies. Interpreted by radiologist. Discussed with radiologist (Dr. Armando). Reviewed by me in PACS.       PROCEDURES  Laceration Repair  Date/Time: 4/10/2017 3:27 AM  Performed by: DAVID BUI  Authorized by: KENDAL IKM   Consent: Verbal consent obtained.  Risks and benefits: risks, benefits and alternatives were discussed  Consent given by: patient  Patient understanding: patient states understanding of the procedure being performed  Patient  consent: the patient's understanding of the procedure matches consent given  Imaging studies: imaging studies available  Patient identity confirmed: verbally with patient  Body area: head/neck  Location details: scalp  Laceration length: 3 cm  Foreign bodies: no foreign bodies  Tendon involvement: none  Nerve involvement: none  Vascular damage: no  Anesthesia: local infiltration    Anesthesia:  Anesthesia: local infiltration  Local Anesthetic: lidocaine 1% with epinephrine   Anesthetic total: 4 mL  Preparation: Patient was prepped and draped in the usual sterile fashion.  Irrigation solution: saline  Irrigation method: tap  Amount of cleaning: standard  Debridement: none  Degree of undermining: none  Skin closure: staples  Number of sutures: 5  Technique: simple  Approximation: close  Approximation difficulty: simple  Patient tolerance: Patient tolerated the procedure well with no immediate complications        EKG           EKG time: 0257  Rhythm/Rate: SR at 67  P waves and VA: normal  QRS, axis: normal   ST and T waves: normal     Interpreted Contemporaneously by me, independently viewed  No prior for comparison.       PROGRESS AND CONSULTS  ED Course     0125: Spoke with Poison Control. They recommended checking drug levels, IV fluids and whole bowel irrigation.     0133: Discussed pt's case with Dr. Clay, who agrees with plan for care. Pt will have NG tube placed.     0310: Dr. Clay spoke with Dr. Cheung (pulmonology), who agrees to admit pt to ICU.    0327: Dr. Cheung (pulmonology) is now at bedside.     MEDICAL DECISION MAKING  Results were reviewed/discussed with the patient and they were also made aware of online access. Pt also made aware that some labs, such as cultures, will not be resulted during ER visit and follow up with PMD is necessary.     MDM  Number of Diagnoses or Management Options     Amount and/or Complexity of Data Reviewed  Clinical lab tests: reviewed and ordered  Tests in the radiology  section of CPT®: ordered and reviewed  Tests in the medicine section of CPT®: ordered and reviewed (EKG time: 0257  Rhythm/Rate: SR at 67  P waves and OR: normal  QRS, axis: normal   ST and T waves: normal     Interpreted Contemporaneously by me, independently viewed  No prior for comparison. )    Patient Progress  Patient progress: stable         DIAGNOSIS  Final diagnoses:   Suicide attempt by multiple drug overdose, initial encounter   Alcohol intoxication, with unspecified complication       DISPOSITION  ADMISSION    Discussed treatment plan and reason for admission with pt/family and admitting physician.  Pt/family voiced understanding of the plan for admission for further testing/treatment as needed.     Latest Documented Vital Signs:  As of 4:06 AM  BP- 107/70 HR- 71 Temp- 97.6 °F (36.4 °C) O2 sat- 98%    --  Documentation assistance provided by jonah Wise for Evert Fuentes.  Information recorded by the scribe was done at my direction and has been verified and validated by me.              Marivel Wise  04/10/17 0338       PEG Shore  04/10/17 0407

## 2017-04-10 NOTE — ED NOTES
Overdose on 60 1mg Clonazepam and 20 150mg Lithium at 2230 with alcohol with intention to harm herself.  Opened the door for police, lost her balance and hit her head.  Lac to back of the head.  No LOC     Debi Simmons RN  04/10/17 0048

## 2017-04-10 NOTE — CONSULTS
"56yo   female presents to ED 6 overnight after an overdose of Lithium, ETOH and Klonopin. Pt states that she was upset that her son allows his wife (her daughter in law) to rule whether or not she gets to see her grandchildren. Pt states that she wants to see her grandchildren more but thinks that her daughter in law doesn't trust her with them mainly because they both have nut allergies and you have to carry epi-pen with you at all times. Pt says that she moved to [place] from [place] about three years ago and purchases a large home so that her son and daughter in law could live with her. She says since then they have all moved out and she couldn't afford the house. 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 Pt was in the [employer] for years and is service connected due to major depression and body dysmorphic disorder and anorexia. Pt thinks she is currently \"fat\" stating that \"my thighs are huge and my arms.\" Pt says she has no friends here in [place] as she is introverted. She says that all of her family and friends are in [place] and the only reason she moved to [place] was for her son. She names her main support person as her ex  now. She says he is remarried but that she is close to he and his wife.     At this point pt regrets her suicide attempt. She says that she immediately regretted it and called the police. When the paramedics arrived she fell backward and now has 5 staples in her head. Pt has a NG tube in place at present and that doesn't seem to be ready to come out. Pt is to be admitted to a telemetry bed for observation. Pt does not want her son to be notified that she is here. She says she plans to contact her ex  to let him know she is here only because she needs some clothes and has " no one else. Pt is afraid that her daughter in law will keep her from her grandchildren more.    [facility]. She sees Rachana Quiñones, therapist at the [facility] and she said the psychiatrist name but it was so soft it could not be made out. Pt will follow up with them. A couple years ago she was in a bad place and was taken to the emergency room from her doctors office it seems and [facility]. Pt says she does not want to go back there as she says that it was awkward to be admitted when she knew all of the people working there. We talked about the possibility of needing to be admitted to a psychiatric unit again. At this point pt will need to be more medically stable for this. Apparently her blood sugar has been running low but otherwise there is not much in the way of medical information available. The nurse is trying to get a hold of the physician at this point.     Access will follow. We are currently on diversion and do not anticipate any beds open today. Pt awaiting a medical bed from the ED at this point.

## 2017-04-11 ENCOUNTER — HOSPITAL ENCOUNTER (INPATIENT)
Facility: HOSPITAL | Age: 57
End: 2017-04-11
Attending: SPECIALIST | Admitting: SPECIALIST

## 2017-04-11 LAB
BACTERIA SPEC AEROBE CULT: NO GROWTH
GLUCOSE BLDC GLUCOMTR-MCNC: 84 MG/DL (ref 70–130)
GLUCOSE BLDC GLUCOMTR-MCNC: 93 MG/DL (ref 70–130)
GLUCOSE BLDC GLUCOMTR-MCNC: 95 MG/DL (ref 70–130)
GLUCOSE BLDC GLUCOMTR-MCNC: 97 MG/DL (ref 70–130)

## 2017-04-11 PROCEDURE — 82962 GLUCOSE BLOOD TEST: CPT

## 2017-04-11 RX ORDER — FLUTICASONE PROPIONATE 50 MCG
2 SPRAY, SUSPENSION (ML) NASAL DAILY
Status: DISCONTINUED | OUTPATIENT
Start: 2017-04-11 | End: 2017-04-12 | Stop reason: HOSPADM

## 2017-04-11 RX ADMIN — TRAMADOL HYDROCHLORIDE 50 MG: 50 TABLET, COATED ORAL at 17:52

## 2017-04-11 RX ADMIN — ACETAMINOPHEN 650 MG: 325 TABLET ORAL at 23:31

## 2017-04-11 RX ADMIN — FLUTICASONE PROPIONATE 2 SPRAY: 50 SPRAY, METERED NASAL at 17:47

## 2017-04-11 RX ADMIN — TRAMADOL HYDROCHLORIDE 50 MG: 50 TABLET, COATED ORAL at 09:34

## 2017-04-11 RX ADMIN — SODIUM CHLORIDE 175 ML/HR: 9 INJECTION, SOLUTION INTRAVENOUS at 12:03

## 2017-04-11 RX ADMIN — TRAMADOL HYDROCHLORIDE 50 MG: 50 TABLET, COATED ORAL at 03:36

## 2017-04-11 NOTE — PROGRESS NOTES
"[facility] PULMONARY CARE         Dr Yolanda Bagley   LOS: 1 day   Patient Care Team:  No Known Provider as PCP - General    Chief Complaint:  Follow-up on suicidal attempt and leave him overdose    Interval History:   She feels good.  She has been sleeping most of the time.  She denies any new symptoms    REVIEW OF SYSTEMS:   CARDIOVASCULAR: No chest pain, chest pressure or chest discomfort. No palpitations or edema.   RESPIRATORY: No shortness of breath, cough or sputum.   GASTROINTESTINAL: No anorexia, nausea, vomiting or diarrhea. No abdominal pain or blood.   HEMATOLOGIC: No bleeding or bruising.     Ventilator/Non-Invasive Ventilation Settings     None          Objective   Vital Signs  Temp:  [98 °F (36.7 °C)-98.6 °F (37 °C)] 98 °F (36.7 °C)  Heart Rate:  [] 69  Resp:  [16-18] 16  BP: ()/(56-76) 106/64    Intake/Output Summary (Last 24 hours) at 04/11/17 1226  Last data filed at 04/11/17 0600   Gross per 24 hour   Intake             2935 ml   Output              600 ml   Net             2335 ml     Flowsheet Rows         First Filed Value    Admission Height  69\" (175.3 cm) Documented at 04/10/2017 0051    Admission Weight  130 lb (59 kg) Documented at 04/10/2017 0051          Physical Exam:   General Appearance:    Alert, cooperative, in no acute distress   Lungs:     Clear to auscultation,respirations regular, even and                  unlabored    Heart:    Regular rhythm and normal rate, normal S1 and S2, no            murmur, no gallop, no rub, no click   Chest Wall:    No abnormalities observed   Abdomen:     Normal bowel sounds, no masses, no organomegaly, soft        non-tender, non-distended, no guarding, no rebound                tenderness   Neuro:   Conscious, alert, oriented x3   Extremities:   Moves all extremities well, no edema, no cyanosis, no             Redness          Results Review:          Results from last 7 days  Lab Units 04/10/17  1015 04/10/17  0623 04/10/17  0209 "   SODIUM mmol/L 145 144 140   POTASSIUM mmol/L 4.4 4.2 3.6   CHLORIDE mmol/L 108* 107 100   TOTAL CO2 mmol/L 29.4* 29.6* 26.7   BUN mg/dL 15 17 20   CREATININE mg/dL 0.74 0.76 0.73   GLUCOSE mg/dL 84 94 110*   CALCIUM mg/dL 8.5* 8.8 9.3           Results from last 7 days  Lab Units 04/10/17  0623 04/10/17  0209   WBC 10*3/mm3 4.22* 6.03   HEMOGLOBIN g/dL 12.9 13.0   HEMATOCRIT % 40.7 40.4   PLATELETS 10*3/mm3 194 209       Results from last 7 days  Lab Units 04/10/17  0209   INR  0.95   APTT seconds 26.8         @[email or web address](bnp)@  I reviewed the patient's new clinical results.  I personally viewed and interpreted the patient's CXR        Medication Review:     insulin aspart 0-7 Units Subcutaneous 4x Daily With Meals & Nightly   sennosides-docusate sodium 2 tablet Oral Nightly         sodium chloride 175 mL/hr Last Rate: 175 mL/hr (04/11/17 1203)       Assessment/Plan     1. Lithium overdose : Stable  2. Etoh abuse -- thiamine, supportive care, watch for signs of withdrawal  3. Suicide attempt   4. Depression  5. Scalp laceration   6.  Nasal congestion    -Start Flonase.  -Awaiting psych evaluation and disposition      Yolanda Bagley MD  04/11/17  12:26 PM              EMR Dragon/Transcription disclaimer:   Much of this encounter note is an electronic transcription/translation of spoken language to printed text. The electronic translation of spoken language may permit erroneous, or at times, nonsensical words or phrases to be inadvertently transcribed; Although I have reviewed the note for such errors, some may still exist.

## 2017-04-11 NOTE — PLAN OF CARE
Problem: Fall Risk (Adult)  Goal: Absence of Falls  Outcome: Ongoing (interventions implemented as appropriate)    Problem: Suicide Risk (Adult,Obstetrics,Pediatric)  Goal: Strength-Based Wellness/Recovery  Outcome: Ongoing (interventions implemented as appropriate)  Goal: Physical Safety  Outcome: Ongoing (interventions implemented as appropriate)    Problem: Overdose, Ingestion/Inhalants (Adult)  Goal: Signs and Symptoms of Listed Potential Problems Will be Absent or Manageable (Overdose, Ingestion/Inhalants)  Outcome: Ongoing (interventions implemented as appropriate)    Problem: Patient Care Overview (Adult)  Goal: Plan of Care Review  Outcome: Ongoing (interventions implemented as appropriate)    04/11/17 0350   Coping/Psychosocial Response Interventions   Plan Of Care Reviewed With patient   Patient Care Overview   Progress improving   Outcome Evaluation   Outcome Summary/Follow up Plan Medicated for c/o headache. Sitter at BS. Denies any SI at present. Hair washed by NA to get matted blood out.       Goal: Adult Individualization and Mutuality  Outcome: Ongoing (interventions implemented as appropriate)  Goal: Discharge Needs Assessment  Outcome: Ongoing (interventions implemented as appropriate)

## 2017-04-11 NOTE — CONSULTS
[facility] eval from yesterday.  Met w/ patient. She is denies any SI now. She states she called for help right after she took the OD.  Patient states since yesterday a friend has visited and her boss from work visit and brought her a care package.  Sitter informed that patient is not allowed some of the things in the care package.  Patient does not have possession of the care package.  She was pleasant. She states her boss has informed her that there have been changes made at work to help decrease the stress.  She states that she realizes she has to live for her grandchildren an her son even though visitation is limited.  She was tearful during the eval.   Discussed case w/ Dr. Hartmann.  He recommended that patient be admitted to [facility] once bed is available.  At present there are no beds available on [facility] at this time.

## 2017-04-11 NOTE — PLAN OF CARE
Problem: Fall Risk (Adult)  Goal: Absence of Falls  Outcome: Ongoing (interventions implemented as appropriate)    Problem: Suicide Risk (Adult,Obstetrics,Pediatric)  Goal: Strength-Based Wellness/Recovery  Outcome: Ongoing (interventions implemented as appropriate)  Goal: Physical Safety  Outcome: Ongoing (interventions implemented as appropriate)    Problem: Overdose, Ingestion/Inhalants (Adult)  Goal: Signs and Symptoms of Listed Potential Problems Will be Absent or Manageable (Overdose, Ingestion/Inhalants)  Outcome: Ongoing (interventions implemented as appropriate)    Problem: Patient Care Overview (Adult)  Goal: Plan of Care Review  Outcome: Ongoing (interventions implemented as appropriate)    04/11/17 0715   Coping/Psychosocial Response Interventions   Plan Of Care Reviewed With patient   Patient Care Overview   Progress improving   Outcome Evaluation   Outcome Summary/Follow up Plan med for headache x2 with good results, sitter at bedside, no SI at this time, to be transferred to [facility] when medcally stable and bed available,        Goal: Adult Individualization and Mutuality  Outcome: Ongoing (interventions implemented as appropriate)

## 2017-04-11 NOTE — PAYOR COMM NOTE
"Jennifer Gabriel (57 y.o. Female)       PLEASE SEE ATTACHED CLINICAL REVIEW ON PATIENT. PT. WAS ADMITTED TO [facility] ON 4/10/17 VIA ER TO A MONITORED TELEM FLOOR      NPI: [facility]    [id number]  TAX ID:  [id number]      [address] [facility], [address]   MAIN NUMBER      UR  [phone]  UR FAX    ADMITTING DR. MIRANDA   NPI: [id number]  [address] [facility]  [address]  [facility] [address]  P: [phone]   F: [phone]    PLEASE CALL   OR  [phone] WITH INPT AUTH AND DAYS APPROVED.    THANK YOU    COSME PEARCE LPN, CCP      Date of Birth Social Security Number Address Home Phone MRN    1960  [address] [facility] [address] [phone] [id number]    Nondenominational Marital Status          [facility] Single       Admission Date Admission Type Admitting Provider Attending Provider Department, Room/Bed    4/10/17 Emergency Jean Miranda MD Anaya, Ervin H., MD [address], 645/1    Discharge Date Discharge Disposition Discharge Destination                      Attending Provider: Jean Miranda MD     Allergies:  No Known Allergies    Isolation:  None   Infection:  None   Code Status:  FULL    Ht:  69\" (175.3 cm)   Wt:  134 lb (60.8 kg)    Admission Cmt:  None   Principal Problem:  None                Active Insurance as of 4/10/2017     Primary Coverage     Payor Plan Insurance Group Employer/Plan Group    [facility]      Payor Plan Address Payor Plan Phone Number Effective From Effective To    [address] [phone] 4/10/2011     [place] [address]       Subscriber Name Subscriber Birth Date Member ID       JENNIFER GABRIEL [id number]                 Emergency Contacts      (Rel.) Home Phone Work Phone Mobile Phone    Contacts,No [phone] -- --               History & Physical      Wood Cheung MD at 4/10/2017  3:56 AM          [facility] " Care    CC: overdose    HPI:  Mrs. Blankenship is a 58yo WF with a history of depression and anorexia.  She presents to the ER today after taking 20 lithium tablets, clonazepam and alcohol in a suicide attempt.  She says she no longer feels suicidal.  She did strike her head as well.  She is drowsy but arouses easily and answers questions appropriately.    Past Medical History:   Diagnosis Date   • Anorexia    • Anxiety    • Bulimia    • Depression      Social History     Social History   • Marital status: Single     Spouse name: N/A   • Number of children: N/A   • Years of education: N/A     Social History Main Topics   • Smoking status: None   • Smokeless tobacco: None   • Alcohol use Yes   • Drug use: None   • Sexual activity: Not Asked     Other Topics Concern   • None     Social History Narrative   • None     History reviewed. No pertinent family history.  MEDS: reviewed  ALL: NKDA  ROS:  Constitutional: Negative for fever.   HENT: Negative for sore throat.   Eyes: Negative.   Respiratory: Negative for cough and shortness of breath.   Cardiovascular: Negative for chest pain.   Gastrointestinal: Negative for abdominal pain, diarrhea and vomiting.   Genitourinary: Negative for dysuria.   Musculoskeletal: Negative for neck pain.   Skin: Negative for rash.   Allergic/Immunologic: Negative.   Neurological: Positive for dizziness. Negative for weakness, numbness and headaches.   Hematological: Negative.   Psychiatric/Behavioral: Positive for suicidal ideas.   Drug overdose    All other systems reviewed and are negative.     Vital Sign Min/Max for last 24 hours  Temp  Min: 97.6 °F (36.4 °C)  Max: 97.6 °F (36.4 °C)   BP  Min: 90/70  Max: 114/81   Pulse  Min: 69  Max: 71   Resp  Min: 14  Max: 14   SpO2  Min: 96 %  Max: 98 %   No Data Recorded   Weight  Min: 130 lb (59 kg)  Max: 130 lb (59 kg)     GEN:  NAD, appears ill, obese, AxOx3  HEENT: PERRL, EOMI, no icterus, mmm, no jvd, trachea midline, neck supple  CHEST: CTA bilat,  no wheezes, no crackles, no use of accessory muscles  CV: RRR, no m/g/r  ABD: soft, nt, nd +bs, no hepatosplenomegaly  EXT: no c/c/e  SKIN: no rashes, no xanthomas, nl turgor  LYMPH: no palpable cervical or supraclavicular lymphadenopathy  NEURO: CN 2-12 intact and symmetric bilaterally  PSYCH: nl affect, nl orientation, nl judgement, nl mood  MSK: No kyphoscoliosis, 5/5 strenght ue and le bilaterally    Labs:  Bmp: normal  Ethanol 0.113  Lithium 0.7  Wbc 6  hgb 13  plts 209    Ct head: no acute disease  CXR: NAD    A/P:  1. Lithium overdose -- she will require neurochecks and frequent lithium levels, every two hours till at least noon today.  2. Etoh abuse -- thiamine, supportive care, watch for signs of withdrawal  3. Suicide attempt -- will need access center to see  4. Depression  5. Scalp laceration     CC 35 mins         Electronically signed by Wood Cheung MD at 4/10/2017  4:02 AM           Emergency Department Notes      Debi Simmons RN at 4/10/2017 12:46 AM          Overdose on 60 1mg Clonazepam and 20 150mg Lithium at 2230 with alcohol with intention to harm herself.  Opened the door for police, lost her balance and hit her head.  Lac to back of the head.  No LOC     Debi Simmons RN  04/10/17 0048       Electronically signed by Debi Simmons RN at 4/10/2017 12:48 AM      PEG Shore at 4/10/2017  1:18 AM      Procedure Orders:    1. Laceration Repair [[id number]] ordered by PEG Shore at 04/10/17 0327           Attestation signed by Virginia Clay MD at 4/10/2017  9:26 AM        I supervised care provided by the midlevel provider.    We have discussed this patient's history, physical exam, and treatment plan.   I have reviewed the note and personally saw and examined the patient and agree with the plan of care.        For this patient encounter, I reviewed the NP or PA documentation, treatment plan, and medical decision making. Virginia Clay MD 4/10/2017 9:26 AM                                 EMERGENCY DEPARTMENT ENCOUNTER    CHIEF COMPLAINT  Chief Complaint: SI and drug overdose   History given by: patient   History limited by: nothing   Room Number: 06/06  PMD: No Known Provider      HPI:  Pt is a 57 y.o. female who presents complaining of SI with overdose tonight around 2300. Pt took 40 2 mg Clonazepam and roughly 30 Lithium (dosage unknown). Pt also used EtOH tonight. When police arrived at her home, pt fell backwards while opening the door and sustained a head laceration. Pt now complains of dizziness but yunier all other sx at this time. Her tetanus is up to date.     Onset: around 2300  Timing: constant  Location: head, generalized   Radiation: does not radiate   Quality: new  Intensity/Severity: moderate   Progression: unchanged   Associated Symptoms: dizziness  Aggravating Factors: none specified   Alleviating Factors: none specified   Previous Episodes: none specified   Treatment before arrival: none specified     PAST MEDICAL HISTORY  Active Ambulatory Problems     Diagnosis Date Noted   • No Active Ambulatory Problems     Resolved Ambulatory Problems     Diagnosis Date Noted   • No Resolved Ambulatory Problems     Past Medical History:   Diagnosis Date   • Anorexia    • Anxiety    • Bulimia    • Depression        PAST SURGICAL HISTORY  History reviewed. No pertinent surgical history.    FAMILY HISTORY  History reviewed. No pertinent family history.    SOCIAL HISTORY  Social History     Social History   • Marital status: Single     Spouse name: N/A   • Number of children: N/A   • Years of education: N/A     Occupational History   • Not on file.     Social History Main Topics   • Smoking status: Not on file   • Smokeless tobacco: Not on file   • Alcohol use Yes   • Drug use: Not on file   • Sexual activity: Not on file     Other Topics Concern   • Not on file     Social History Narrative   • No narrative on file       ALLERGIES  Review of patient's allergies indicates no  known allergies.    REVIEW OF SYSTEMS  Review of Systems   Constitutional: Negative for fever.   HENT: Negative for sore throat.    Eyes: Negative.    Respiratory: Negative for cough and shortness of breath.    Cardiovascular: Negative for chest pain.   Gastrointestinal: Negative for abdominal pain, diarrhea and vomiting.   Genitourinary: Negative for dysuria.   Musculoskeletal: Negative for neck pain.   Skin: Negative for rash.   Allergic/Immunologic: Negative.    Neurological: Positive for dizziness. Negative for weakness, numbness and headaches.   Hematological: Negative.    Psychiatric/Behavioral: Positive for suicidal ideas.        Drug overdose    All other systems reviewed and are negative.      PHYSICAL EXAM  ED Triage Vitals   Temp Heart Rate Resp BP SpO2   04/10/17 0051 04/10/17 0051 04/10/17 0051 04/10/17 0051 04/10/17 0051   97.6 °F (36.4 °C) 70 14 90/70 97 %      Temp src Heart Rate Source Patient Position BP Location FiO2 (%)   -- -- -- -- --                Physical Exam   Constitutional: She is oriented to person, place, and time.   Smells of alcohol and appears intoxicated, drowsy but easily aroused by voice.   HENT:   Head: Head is with laceration (3 cm posterior scalp).   Eyes: EOM are normal. Pupils are equal, round, and reactive to light.   Neck: Normal range of motion.   No C-spine tenderness.   Cardiovascular: Normal rate and regular rhythm.    Pulmonary/Chest: Effort normal and breath sounds normal. No respiratory distress. She exhibits no tenderness.   Abdominal: Soft. There is no tenderness.   Musculoskeletal: Normal range of motion. She exhibits no deformity.   Neurological: She is alert and oriented to person, place, and time. She has normal sensation and normal strength.   Skin: Skin is warm and dry.   Psychiatric: She expresses suicidal ideation. She expresses suicidal plans. She has a flat affect.   Nursing note and vitals reviewed.    I ordered the above labs and reviewed the  results    RADIOLOGY  XR Chest 2 View    (Results Pending)   CT Head Without Contrast    (Results Pending)   XR Spine Cervical Complete 4 or 5 View    (Results Pending)      CT Head is negative.     CXR is negative.     XR cervical spine shows degenerative changes but no acute fracture.     I ordered the above noted radiological studies. Interpreted by radiologist. Discussed with radiologist (Dr. Armando). Reviewed by me in PACS.       PROCEDURES  Laceration Repair  Date/Time: 4/10/2017 3:27 AM  Performed by: DAVID BUI  Authorized by: KENDAL KIM   Consent: Verbal consent obtained.  Risks and benefits: risks, benefits and alternatives were discussed  Consent given by: patient  Patient understanding: patient states understanding of the procedure being performed  Patient consent: the patient's understanding of the procedure matches consent given  Imaging studies: imaging studies available  Patient identity confirmed: verbally with patient  Body area: head/neck  Location details: scalp  Laceration length: 3 cm  Foreign bodies: no foreign bodies  Tendon involvement: none  Nerve involvement: none  Vascular damage: no  Anesthesia: local infiltration    Anesthesia:  Anesthesia: local infiltration  Local Anesthetic: lidocaine 1% with epinephrine   Anesthetic total: 4 mL  Preparation: Patient was prepped and draped in the usual sterile fashion.  Irrigation solution: saline  Irrigation method: tap  Amount of cleaning: standard  Debridement: none  Degree of undermining: none  Skin closure: staples  Number of sutures: 5  Technique: simple  Approximation: close  Approximation difficulty: simple  Patient tolerance: Patient tolerated the procedure well with no immediate complications        EKG           EKG time: 0257  Rhythm/Rate: SR at 67  P waves and IA: normal  QRS, axis: normal   ST and T waves: normal     Interpreted Contemporaneously by me, independently viewed  No prior for comparison.       PROGRESS AND  CONSULTS  ED Course     0125: Spoke with Poison Control. They recommended checking drug levels, IV fluids and whole bowel irrigation.     0133: Discussed pt's case with Dr. Clay, who agrees with plan for care. Pt will have NG tube placed.     0310: Dr. Clay spoke with Dr. Cheung (pulmonology), who agrees to admit pt to ICU.    0327: Dr. Cheung (pulmonology) is now at bedside.     MEDICAL DECISION MAKING  Results were reviewed/discussed with the patient and they were also made aware of online access. Pt also made aware that some labs, such as cultures, will not be resulted during ER visit and follow up with PMD is necessary.     MDM  Number of Diagnoses or Management Options     Amount and/or Complexity of Data Reviewed  Clinical lab tests: reviewed and ordered  Tests in the radiology section of CPT®:  ordered and reviewed  Tests in the medicine section of CPT®:  ordered and reviewed (EKG time: 0257  Rhythm/Rate: SR at 67  P waves and OK: normal  QRS, axis: normal   ST and T waves: normal     Interpreted Contemporaneously by me, independently viewed  No prior for comparison. )    Patient Progress  Patient progress: stable         DIAGNOSIS  Final diagnoses:   Suicide attempt by multiple drug overdose, initial encounter   Alcohol intoxication, with unspecified complication       DISPOSITION  ADMISSION    Discussed treatment plan and reason for admission with pt/family and admitting physician.  Pt/family voiced understanding of the plan for admission for further testing/treatment as needed.     Latest Documented Vital Signs:  As of 4:06 AM  BP- 107/70 HR- 71 Temp- 97.6 °F (36.4 °C) O2 sat- 98%    --  Documentation assistance provided by jonah Wise for Evert Fuentes.  Information recorded by the jonah was done at my direction and has been verified and validated by me.              Marivel Wise  04/10/17 0338       PEG Shore  04/10/17 0407       Electronically signed by Virginia Clay MD at 4/10/2017  9:26  SYDNEY Clay MD at 4/10/2017  1:39 AM          I supervised care provided by the midlevel provider.    We have discussed this patient's history, physical exam, and treatment plan.   I have reviewed the note and personally saw and examined the patient and agree with the plan of care.      Pt reports that at about 23:00 tonight, pt attempted to overdose on her own prescribed medications as she has recently been depressed. Pt states that she took Clonazepam 1 mg (40 pills) and Lithium 150 mg (20 capsules) with ETOH. Pt then called the police after ingestion and reports that when she opened the door, she fell, sustaining injury to the head. She denies LOC.     On physical exam, pt is oriented. She is sleepy, but rouses easily to verbal stimuli..She is able to answer most questions appropriately.   Pupils are round and reactive to light bilaterally.  Pt with kerlex bandage over 3cm scalp wound with good hemostasis  Lungs good air movement bilat,  Abdomen nonTTPalp        EKG:      EKG time: 02:57 AM  Rhythm/Rate: NSR rate 60s  P waves and NV: Normal P waves. Normal GERSON  QRS, axis: Normal QRS   ST and T waves: Normal ST  QT is normal     Interpreted Contemporaneously by me, independently viewed  No old EKG available for comparison                PROGRESS NOTES:    1:39 AM: Discussed case with Poison Control. She states that symptoms for toxic levels of lithium include confusion, somnolence, tremor, respiratory depression, nausea, and vomiting. Cardiac symptoms include low BP, V-tach, and increased HR. It is not affected by charcoal, thus, whole-bowel irrigation is recommended. If toxic levels are reached, dialysis is recommended. It is also recommended that pt's lithium levels are checked every 2 hours until the level decreases.     1:58 AM: Pt is receiving IV fluids. She will have NG tube placed (golytely ordered). Pt will also be placed on a 72-hour hold.     3:01 AM: Rechecked pt. Pt has NG tube in place, which  she is tolerating well. On re-evaluation, pt is arousable to verbal stimuli. O2 sat is 98% on room air. BP is 114/81. HR is in the 70s.     3:10 AM: Discussed case with Dr. Wood Cheung, intensivist. He will admit pt to the ICU. Head laceration was repaired by Mr. Alfredo PA-C. Pt's CT Head is negative acute.         DISPOSITION: Pt admitted to the ICU.          Final diagnoses:   Suicide attempt by multiple drug overdose, initial encounter   Alcohol intoxication, with unspecified complication   fall  Scalp laceration    Documentation assistance provided by Aiden Jacobo. Information recorded by the scribe was done at my direction and has been verified and validated by me.     Entered by Aiden Jacobo, acting as scribe for Dr. Obed MD.                   Aiden Jacobo  04/10/17 0718       Virginia Clay MD  04/10/17 0926       Electronically signed by Virginia Clay MD at 4/10/2017  9:26 AM      Zita Waters RN at 4/10/2017  1:53 AM          Pt states took 40 of the clonazepam and 20 of the lithium followed by ETOH     Zita Waters RN  04/10/17 0154       Electronically signed by Zita Waters RN at 4/10/2017  1:54 AM        Vital Signs (last 72 hrs)       04/08 0700  -  04/09 0659 04/09 0700  -  04/10 0659 04/10 0700  -  04/11 0659 04/11 0700  -  04/11 0958   Most Recent    Temp (°F)     97.6    98.1 -  98.6       98.1 (36.7)    Heart Rate   66 -  76    67 -  105    77 -  86     86    Resp     14    16 -  18      16     16    BP   90/70 -  114/81    (!)85/70 -  113/76      102/61     102/61    SpO2 (%)   92 -  98    93 -  100      95     95          Intake & Output (last 3 days)       04/08 0701 - 04/09 0700 04/09 0701 - 04/10 0700 04/10 0701 - 04/11 0700 04/11 0701 - 04/12 0700    I.V. (mL/kg)   4832.9 (79.5)     IV Piggyback  1000 2050     Total Intake(mL/kg)  1000 (17) 6882.9 (113.2)     Urine (mL/kg/hr)  1300 0 (0)     Emesis/NG output  0      Stool   600 (0.4)     Total Output    1300 600      Net   -300 +6282.9              Unmeasured Urine Occurrence   5 x 1 x    Unmeasured Stool Occurrence   3 x     Unmeasured Emesis Occurrence  1 x          Lines, Drains & Airways    Active LDAs     Name:   Placement date:   Placement time:   Site:   Days:    Peripheral IV Line - Single Lumen 04/10/17 0209 cephalic vein (lateral side of arm), right 20 gauge  04/10/17    0209      1    Peripheral IV Line - Single Lumen 04/10/17 0629 median cubital vein (antecubital fossa), left 18 gauge  04/10/17    0629      1         Inactive LDAs     Name:   Placement date:   Placement time:   Removal date:   Removal time:   Site:   Days:    [REMOVED] Naso/Oral/Gastric Tube 04/10/17 0239 [place] sump 14 right nostril  04/10/17    0239    04/10/17    1415      less than 1                Hospital Medications (all)       Dose Frequency Start End    acetaminophen (TYLENOL) tablet 650 mg 650 mg Every 4 Hours PRN 4/10/2017     Sig - Route: Take 2 tablets by mouth Every 4 (Four) Hours As Needed for Headache or Fever (temperature greater than 101.5 F). - Oral    acetaminophen (TYLENOL) tablet 650 mg 650 mg Every 4 Hours PRN 4/10/2017     Sig - Route: Take 2 tablets by mouth Every 4 (Four) Hours As Needed for Mild Pain (1-3). - Oral    dextrose (D50W) solution 25 g 25 g Every 15 Minutes PRN 4/10/2017     Sig - Route: Infuse 50 mL into a venous catheter Every 15 (Fifteen) Minutes As Needed for Low Blood Sugar (Blood Sugar Less Than 70, Patient Has IV Access - Unresponsive, NPO or Unable To Safely Swallow). - Intravenous    dextrose (GLUTOSE) oral gel 15 g 15 g Every 15 Minutes PRN 4/10/2017     Sig - Route: Take 15 g by mouth Every 15 (Fifteen) Minutes As Needed for Low Blood Sugar (Blood Sugar Less Than 70, Patient Alert, Is Not NPO & Can Safely Swallow). - Oral    glucagon (human recombinant) (GLUCAGEN DIAGNOSTIC) injection 1 mg 1 mg Every 15 Minutes PRN 4/10/2017     Sig - Route: Inject 1 mg under the skin Every 15 (Fifteen)  "Minutes As Needed (Blood Glucose Less Than 70 - Patient Without IV Access - Unresponsive, NPO or Unable To Safely Swallow). - Subcutaneous    insulin aspart (novoLOG) injection 0-7 Units 0-7 Units 4 Times Daily With Meals & Nightly 4/10/2017     Sig - Route: Inject 0-7 Units under the skin 4 (Four) Times a Day With Meals & at Bedtime. - Subcutaneous    ipratropium-albuterol (DUO-NEB) nebulizer solution 3 mL 3 mL Every 2 Hours PRN 4/10/2017     Sig - Route: Take 3 mL by nebulization Every 2 (Two) Hours As Needed for Wheezing or Shortness of Air. - Nebulization    morphine injection 1 mg 1 mg Every 4 Hours PRN 4/10/2017 4/20/2017    Sig - Route: Infuse 0.5 mL into a venous catheter Every 4 (Four) Hours As Needed for Severe Pain (7-10). - Intravenous    Linked Group 1:  \"And\" Linked Group Details        naloxone (NARCAN) injection 0.4 mg 0.4 mg Every 5 Minutes PRN 4/10/2017     Sig - Route: Infuse 1 mL into a venous catheter Every 5 (Five) Minutes As Needed for Respiratory Depression. - Intravenous    Linked Group 1:  \"And\" Linked Group Details        nitroglycerin (NITROSTAT) SL tablet 0.4 mg 0.4 mg Every 5 Minutes PRN 4/10/2017     Sig - Route: Place 1 tablet under the tongue Every 5 (Five) Minutes As Needed for Chest Pain (if systolic BP greater than 100 mm/Hg.). - Sublingual    ondansetron (ZOFRAN) injection 4 mg 4 mg Every 6 Hours PRN 4/10/2017     Sig - Route: Infuse 2 mL into a venous catheter Every 6 (Six) Hours As Needed for Nausea or Vomiting. - Intravenous    polyethylene glycol with electrolytes (GOLYTELY) solution 1,000 mL 1,000 mL Every 1 Hour 4/10/2017 4/10/2017    Sig - Route: 1,000 mL by Nasogastric route Every 1 (One) Hour. - Nasogastric    potassium & sodium phosphates (PHOS-NAK) 280-160-250 MG packet - for Phosphorus less than 1.3 mg/dL 1 packet 2 Times Daily 4/10/2017 4/11/2017    Sig - Route: Take 1 packet by mouth 2 (Two) Times a Day. - Oral    potassium chloride (MICRO-K) CR capsule 40 mEq 40 " "mEq As Needed 4/10/2017     Sig - Route: Take 4 capsules by mouth As Needed (potassium replacement.  see admin instructions). - Oral    Linked Group 2:  \"Or\" Linked Group Details        potassium chloride 10 mEq in 100 mL IVPB 10 mEq Every 1 Hour PRN 4/10/2017     Sig - Route: Infuse 100 mL into a venous catheter Every 1 (One) Hour As Needed (potassium protocol PERIPHERAL - see admin instructions). - Intravenous    Linked Group 2:  \"Or\" Linked Group Details        sennosides-docusate sodium (SENOKOT-S) 8.6-50 MG tablet 2 tablet 2 tablet Nightly 4/10/2017     Sig - Route: Take 2 tablets by mouth Every Night. - Oral    sennosides-docusate sodium (SENOKOT-S) 8.6-50 MG tablet 2 tablet 2 tablet 2 Times Daily PRN 4/10/2017     Sig - Route: Take 2 tablets by mouth 2 (Two) Times a Day As Needed for Constipation. - Oral    sodium chloride 0.9 % bolus 1,000 mL 1,000 mL Once 4/10/2017 4/10/2017    Sig - Route: Infuse 1,000 mL into a venous catheter 1 (One) Time. - Intravenous    Cosign for Ordering: Accepted by Virginia Clay MD on 4/10/2017  1:45 AM    sodium chloride 0.9 % bolus 1,000 mL 1,000 mL Once 4/10/2017 4/10/2017    Sig - Route: Infuse 1,000 mL into a venous catheter 1 (One) Time. - Intravenous    sodium chloride 0.9 % flush 1-10 mL 1-10 mL As Needed 4/10/2017     Sig - Route: Infuse 1-10 mL into a venous catheter As Needed for Line Care. - Intravenous    sodium chloride 0.9 % flush 1-10 mL 1-10 mL As Needed 4/10/2017     Sig - Route: Infuse 1-10 mL into a venous catheter As Needed for Line Care. - Intravenous    sodium chloride 0.9 % flush 10 mL 10 mL As Needed 4/10/2017     Sig - Route: Infuse 10 mL into a venous catheter As Needed for Line Care. - Intravenous    Cosign for Ordering: Accepted by Virginia Clay MD on 4/10/2017  1:45 AM    Linked Group 3:  \"And\" Linked Group Details        sodium chloride 0.9 % infusion 175 mL/hr Continuous 4/10/2017     Sig - Route: Infuse 175 mL/hr into a venous catheter Continuous. - " "Intravenous    traMADol (ULTRAM) tablet 50 mg 50 mg Every 6 Hours PRN 4/10/2017     Sig - Route: Take 1 tablet by mouth Every 6 (Six) Hours As Needed for Moderate Pain (4-6). - Oral    enoxaparin (LOVENOX) syringe 40 mg (Discontinued) 40 mg Daily 4/10/2017 4/10/2017    Sig - Route: Inject 0.4 mL under the skin Daily. - Subcutaneous    potassium chloride (KLOR-CON) packet 40 mEq (Discontinued) 40 mEq As Needed 4/10/2017 4/10/2017    Sig - Route: Take 40 mEq by mouth As Needed (potassium replacement, see admin instructions). - Oral    Reason for Discontinue: Formulary change    Linked Group 2:  \"Or\" Linked Group Details              Lab Results (last 72 hours)     Procedure Component Value Units Date/Time    CBC & Differential [[id number]] Collected:  04/10/17 0209    Specimen:  Blood Updated:  04/10/17 0225    Narrative:       The following orders were created for panel order CBC & Differential.  Procedure                               Abnormality         Status                     ---------                               -----------         ------                     CBC Auto Differential[[id number]]         Abnormal            Final result                 Please view results for these tests on the individual orders.    CBC Auto Differential [[id number]]  (Abnormal) Collected:  04/10/17 0209    Specimen:  Blood Updated:  04/10/17 0225     WBC 6.03 10*3/mm3      RBC 4.31 10*6/mm3      Hemoglobin 13.0 g/dL      Hematocrit 40.4 %      MCV 93.7 fL      MCH 30.2 pg      MCHC 32.2 (L) g/dL      RDW 12.3 %      RDW-SD 41.8 fl      MPV 10.2 fL      Platelets 209 10*3/mm3      Neutrophil % 41.9 (L) %      Lymphocyte % 40.0 %      Monocyte % 11.3 %      Eosinophil % 6.3 (H) %      Basophil % 0.5 %      Immature Grans % 0.0 %      Neutrophils, Absolute 2.53 10*3/mm3      Lymphocytes, Absolute 2.41 10*3/mm3      Monocytes, Absolute 0.68 10*3/mm3      Eosinophils, Absolute 0.38 10*3/mm3      Basophils, Absolute 0.03 10*3/mm3      " Immature Grans, Absolute 0.00 10*3/mm3     Protime-INR [[id number]]  (Normal) Collected:  04/10/17 0209    Specimen:  Blood Updated:  04/10/17 0235     Protime 12.3 Seconds      INR 0.95    aPTT [[id number]]  (Normal) Collected:  04/10/17 0209    Specimen:  Blood Updated:  04/10/17 0235     PTT 26.8 seconds     Lithium Level [[id number]]  (Normal) Collected:  04/10/17 0209    Specimen:  Blood Updated:  04/10/17 0244     Lithium 0.7 mmol/L     Magnesium [[id number]]  (Normal) Collected:  04/10/17 0209    Specimen:  Blood Updated:  04/10/17 0247     Magnesium 2.1 mg/dL     Ethanol [[id number]]  (Abnormal) Collected:  04/10/17 0209    Specimen:  Blood Updated:  04/10/17 0247     Ethanol 113 (H) mg/dL      Ethanol % 0.113 %     Acetaminophen Level [[id number]]  (Abnormal) Collected:  04/10/17 0209    Specimen:  Blood Updated:  04/10/17 0247     Acetaminophen <5.0 (L) mcg/mL     Salicylate Level [[id number]] Collected:  04/10/17 0209    Specimen:  Blood Updated:  04/10/17 0247     Salicylate <0.3 mg/dL     Narrative:       Therapeutic range for Salicylates:  3.0 - 10.0 mg/dL for antipyretic/analgesic conditions  15.0 - 30.0 mg/dL for anti-inflammatory conditions    Comprehensive Metabolic Panel [[id number]]  (Abnormal) Collected:  04/10/17 0209    Specimen:  Blood Updated:  04/10/17 0255     Glucose 110 (H) mg/dL      BUN 20 mg/dL      Creatinine 0.73 mg/dL      Sodium 140 mmol/L      Potassium 3.6 mmol/L      Chloride 100 mmol/L      CO2 26.7 mmol/L      Calcium 9.3 mg/dL      Total Protein 6.7 g/dL      Albumin 3.90 g/dL      ALT (SGPT) 28 U/L      AST (SGOT) 28 U/L      Alkaline Phosphatase 82 U/L      Total Bilirubin <0.2 mg/dL      eGFR Non African Amer 82 mL/min/1.73      Globulin 2.8 gm/dL      A/G Ratio 1.4 g/dL      BUN/Creatinine Ratio 27.4 (H)     Anion Gap 13.3 mmol/L     Urinalysis With / Culture If Indicated [[id number]]  (Abnormal) Collected:  04/10/17 0410    Specimen:  Urine from Urine, Clean Catch Updated:  04/10/17 0429      Color, UA Yellow     Appearance, UA Clear     pH, UA 7.0     Specific Gravity, UA 1.008     Glucose, UA Negative     Ketones, UA Negative     Bilirubin, UA Negative     Blood, UA Negative     Protein, UA Negative     Leuk Esterase, UA Small (1+) (A)     Nitrite, UA Negative     Urobilinogen, UA 0.2 E.U./dL    Urinalysis, Microscopic Only [[id number]]  (Abnormal) Collected:  04/10/17 0410    Specimen:  Urine from Urine, Clean Catch Updated:  04/10/17 0429     RBC, UA 0-2 /HPF      WBC, UA 3-5 (A) /HPF      Bacteria, UA None Seen /HPF      Squamous Epithelial Cells, UA 0-2 /HPF      Hyaline Casts, UA 0-2 /LPF      Methodology Automated Microscopy    Urine Drug Screen [[id number]]  (Abnormal) Collected:  04/10/17 0410    Specimen:  Urine from Urine, Clean Catch Updated:  04/10/17 0449     Amphet/Methamphet, Screen Negative     Barbiturates Screen, Urine Negative     Benzodiazepine Screen, Urine Positive (A)     Cocaine Screen, Urine Negative     Opiate Screen Negative     THC, Screen, Urine Negative     Methadone Screen, Urine Negative     Oxycodone Screen, Urine Negative    Narrative:       Lithium Level [[id number]]  (Abnormal) Collected:  04/10/17 0406    Specimen:  Blood Updated:  04/10/17 0504     Lithium 1.4 (H) mmol/L     POC Glucose Fingerstick [[id number]]  (Normal) Collected:  04/10/17 0619    Specimen:  Blood Updated:  04/10/17 0621     Glucose 89 mg/dL     Narrative:       Meter: [id number] : [id number]    CBC & Differential [[id number]] Collected:  04/10/17 0623    Specimen:  Blood Updated:  04/10/17 0652    Narrative:       CBC Auto Differential [[id number]]  (Abnormal) Collected:  04/10/17 0623    Specimen:  Blood Updated:  04/10/17 0652     WBC 4.22 (L) 10*3/mm3      RBC 4.32 10*6/mm3      Hemoglobin 12.9 g/dL      Hematocrit 40.7 %      MCV 94.2 fL      MCH 29.9 pg      MCHC 31.7 (L) g/dL      RDW 12.5 %      RDW-SD 42.9 fl      MPV 10.4 fL      Platelets 194 10*3/mm3      Neutrophil % 52.2 %       Lymphocyte % 34.8 %      Monocyte % 7.3 %      Eosinophil % 5.5 %      Basophil % 0.2 %      Immature Grans % 0.0 %      Neutrophils, Absolute 2.20 10*3/mm3      Lymphocytes, Absolute 1.47 10*3/mm3      Monocytes, Absolute 0.31 10*3/mm3      Eosinophils, Absolute 0.23 10*3/mm3      Basophils, Absolute 0.01 10*3/mm3      Immature Grans, Absolute 0.00 10*3/mm3     Lithium Level [[id number]]  (Abnormal) Collected:  04/10/17 0623    Specimen:  Blood Updated:  04/10/17 0703     Lithium 1.4 (H) mmol/L     Comprehensive Metabolic Panel [[id number]]  (Abnormal) Collected:  04/10/17 0623    Specimen:  Blood Updated:  04/10/17 0710     Glucose 94 mg/dL      BUN 17 mg/dL      Creatinine 0.76 mg/dL      Sodium 144 mmol/L      Potassium 4.2 mmol/L      Chloride 107 mmol/L      CO2 29.6 (H) mmol/L      Calcium 8.8 mg/dL      Total Protein 6.7 g/dL      Albumin 3.90 g/dL      ALT (SGPT) 27 U/L      AST (SGOT) 27 U/L      Alkaline Phosphatase 66 U/L      Total Bilirubin 0.2 mg/dL      eGFR Non African Amer 78 mL/min/1.73      Globulin 2.8 gm/dL      A/G Ratio 1.4 g/dL      BUN/Creatinine Ratio 22.4     Anion Gap 7.4 mmol/L     POC Glucose Fingerstick [[id number]]  (Normal) Collected:  04/10/17 0738    Specimen:  Blood Updated:  04/10/17 0741     Glucose 79 mg/dL     Narrative:       Meter: [id number] : [id number] Newberry Genny C    Luck Level [[id number]]  (Normal) Collected:  04/10/17 0807    Specimen:  Blood Updated:  04/10/17 0829     Lithium 1.1 mmol/L     POC Glucose Fingerstick [[id number]]  (Normal) Collected:  04/10/17 0906    Specimen:  Blood Updated:  04/10/17 0908     Glucose 78 mg/dL     Narrative:       Meter: [id number] : [id number] Geovanna Genny C    Basic Metabolic Panel [[id number]]  (Abnormal) Collected:  04/10/17 1015    Specimen:  Blood Updated:  04/10/17 1051     Glucose 84 mg/dL      BUN 15 mg/dL      Creatinine 0.74 mg/dL      Sodium 145 mmol/L      Potassium 4.4 mmol/L      Chloride 108 (H) mmol/L      CO2  29.4 (H) mmol/L      Calcium 8.5 (L) mg/dL      eGFR Non African Amer 80 mL/min/1.73      BUN/Creatinine Ratio 20.3     Anion Gap 7.6 mmol/L     Narrative:       Magnesium [[id number]]  (Normal) Collected:  04/10/17 1015    Specimen:  Blood Updated:  04/10/17 1051     Magnesium 2.1 mg/dL     Phosphorus [[id number]]  (Normal) Collected:  04/10/17 1015    Specimen:  Blood Updated:  04/10/17 1051     Phosphorus 2.8 mg/dL     Lithium Level [[id number]]  (Normal) Collected:  04/10/17 1016    Specimen:  Blood Updated:  04/10/17 1055     Lithium 0.9 mmol/L     POC Glucose Fingerstick [[id number]]  (Normal) Collected:  04/10/17 1148    Specimen:  Blood Updated:  04/10/17 1153     Glucose 72 mg/dL     Narrative:       Meter: [id number] : [id number] Kun Mak    POC Glucose Fingerstick [[id number]]  (Abnormal) Collected:  04/10/17 1226    Specimen:  Blood Updated:  04/10/17 1229     Glucose 65 (L) mg/dL     Narrative:       Meter: [id number] : [id number] Geovanna FOOTE    POC Glucose Fingerstick [[id number]]  (Normal) Collected:  04/10/17 1357    Specimen:  Blood Updated:  04/10/17 1358     Glucose 74 mg/dL     Narrative:       Meter: [id number] : [id number] Kun Mak    POC Glucose Fingerstick [[id number]]  (Normal) Collected:  04/10/17 1819    Specimen:  Blood Updated:  04/10/17 1831     Glucose 93 mg/dL     Narrative:       Meter: [id number] : [id number] Wade Prado    POC Glucose Fingerstick [[id number]]  (Normal) Collected:  04/10/17 2107    Specimen:  Blood Updated:  04/10/17 2108     Glucose 84 mg/dL     Narrative:       Meter: [id number] : [id number] Usama CAMP    Urine Culture [[id number]]  (Normal) Collected:  04/10/17 0410    Specimen:  Urine from Urine, Clean Catch Updated:  04/11/17 0629     Urine Culture No growth    POC Glucose Fingerstick [[id number]]  (Normal) Collected:  04/11/17 0758    Specimen:  Blood Updated:  04/11/17 0759     Glucose 93 mg/dL     Narrative:       Meter:  [id number] : [id number] Sergio Carmona          Imaging Results (last 72 hours)     Procedure Component Value Units Date/Time    CT Head Without Contrast [[id number]] Collected:  04/10/17 0634     Updated:  04/10/17 2101    Narrative:       CT SCAN OF THE BRAIN WITHOUT CONTRAST     HISTORY: Fell with posterior head trauma. Headache.     FINDINGS: The CT scan was performed as an emergency procedure through  the brain without contrast. The ventricles are normal in size and  midline. There is no evidence of intracranial hemorrhage or mass effect  and the visualized sinuses are clear.     This report was finalized on 4/10/2017 8:58 PM by Dr. Jay Armando MD.       XR Chest 2 View [[id number]] Collected:  04/10/17 0624     Updated:  04/10/17 2101    Narrative:       2-VIEW CHEST     HISTORY: Overdose. Fell. Chest pain.     FINDINGS: The lungs are well-expanded and clear and the heart size is  normal. There is no acute disease.     This report was finalized on 4/10/2017 8:58 PM by Dr. Jay Armando MD.       XR Spine Cervical Complete 4 or 5 View [[id number]] Collected:  04/10/17 0626     Updated:  04/10/17 2102    Narrative:       5-VIEW CERVICAL SPINE     HISTORY: Fell. Neck pain.     FINDINGS: There is severe disc space narrowing and spurring at C4-5,  C5-6, and C6-7. There is localized reversal of the cervical lordosis  centered at C4-5 which may relate to the degenerative change versus an  element of muscle spasm. No acute fracture is seen. There is some bony  foraminal encroachment, particularly at C6-7 bilaterally.     This report was finalized on 4/10/2017 8:59 PM by Dr. Jay Armando MD.             ECG/EMG Results (last 72 hours)     Procedure Component Value Units Date/Time    ECG 12 Lead [[id number]] Collected:  04/10/17 0257     Updated:  04/11/17 0159    Narrative:       RR Interval= 896 ms  WI Interval= 176 ms  QRSD Interval= 84 ms  QT Interval= 444 ms  QTc Interval= 469 ms  Heart Rate= 67 ms  P Axis= 67  deg  QRS Axis= 60 deg  T Wave Axis= 63 deg  I: 40 Axis= 50 deg  T: 40 Axis= 51 deg  ST Axis= 54 deg  SINUS RHYTHM  NO PRIOR TRACING AVAILABLE FOR COMPARISON  Electronically Signed by:  Adam Woods 11-Apr-2017 01:58:46  Date and Time of Study: 2017-04-10 02:57:54          Orders (last 72 hrs)     Start     Ordered    04/11/17 0800  POC Glucose Fingerstick  Once      04/11/17 0759    04/10/17 2109  POC Glucose Fingerstick  Once      04/10/17 2108    04/10/17 2100  sennosides-docusate sodium (SENOKOT-S) 8.6-50 MG tablet 2 tablet  Nightly      04/10/17 0355    04/10/17 1832  POC Glucose Fingerstick  Once      04/10/17 1831    04/10/17 1458  Nasograstric tube discontinue  Once      04/10/17 1458    04/10/17 1406  Diet Regular  Diet Effective Now      04/10/17 1405    04/10/17 1358  POC Glucose Fingerstick  Once      04/10/17 1358    04/10/17 1230  POC Glucose Fingerstick  Once      04/10/17 1229    04/10/17 1200  Vital Signs  Every 4 Hours     Comments:  Per per hospital policy    04/10/17 0908    04/10/17 1154  POC Glucose Fingerstick  Once      04/10/17 1153    04/10/17 1000  Strict Intake and Output  Every Hour      04/10/17 0908    04/10/17 0910  sodium chloride 0.9 % bolus 1,000 mL  Once      04/10/17 0908    04/10/17 0909  POC Glucose Fingerstick  Once      04/10/17 0908    04/10/17 0909  Inpatient Admission  Once      04/10/17 0909    04/10/17 0908  Pulse Oximetry,  Spot  Once      04/10/17 0908    04/10/17 0908  Weigh patient  Once      04/10/17 0908    04/10/17 0908  Notify Physician (with Parameters)  Until Discontinued      04/10/17 0908    04/10/17 0908  Oxygen Therapy-  Continuous      04/10/17 0908    04/10/17 0908  Insert Peripheral IV  Once      04/10/17 0908    04/10/17 0908  Saline Lock & Maintain IV Access  Continuous      04/10/17 0908    04/10/17 0908  Full Code  Continuous      04/10/17 0908    04/10/17 0908  VTE Risk Assessment - Low Risk  Once      04/10/17 0908    04/10/17 0908  Pharmacologic  VTE Prophylaxis Not Indicated: Already Ordered in This Admission  Once      04/10/17 0908    04/10/17 0908  Place Sequential Compression Device  Once      04/10/17 0908    04/10/17 0908  Maintain Sequential Compression Device  Continuous      04/10/17 0908    04/10/17 0908  Saline Lock  Continuous,   Status:  Canceled      04/10/17 0908    04/10/17 0908  Continuous Pulse Oximetry  Continuous     Comments:  For Unresponsiveness, Acute Dyspnea, Cyanosis or Suspected Hypoxemia.  Notify Physician    04/10/17 0908    04/10/17 0908  Oxygen Therapy- Nasal Cannula; Titrate for SPO2: 90%, equal to or greater than  Continuous     Comments:  For Unresponsiveness, Acute Dyspnea, Cyanosis or Suspected Hypoxemia.  Notify Physician    04/10/17 0908    04/10/17 0908  Nurse to Place Order for ECG 12-Lead if Patient Experiences Acute Chest Pain or Dysrhythmias  Continuous      04/10/17 0908    04/10/17 0908  May Be Off Telemetry for Tests  Continuous      04/10/17 0908    04/10/17 0908  ACLS Protocol For Life Threatening Dysrhythmias (If No DNR Order)  Continuous      04/10/17 0908    04/10/17 0908  Notify Provider if ACLS Protocol Activated  Until Discontinued      04/10/17 0908    04/10/17 0908  Diet Regular  Diet Effective Now,   Status:  Canceled      04/10/17 0908    04/10/17 0907  nitroglycerin (NITROSTAT) SL tablet 0.4 mg  Every 5 Minutes PRN      04/10/17 0908    04/10/17 0907  sodium chloride 0.9 % flush 1-10 mL  As Needed      04/10/17 0908    04/10/17 0907  acetaminophen (TYLENOL) tablet 650 mg  Every 4 Hours PRN      04/10/17 0908    04/10/17 0907  traMADol (ULTRAM) tablet 50 mg  Every 6 Hours PRN      04/10/17 0908    04/10/17 0907  morphine injection 1 mg  Every 4 Hours PRN      04/10/17 0908    04/10/17 0907  naloxone (NARCAN) injection 0.4 mg  Every 5 Minutes PRN      04/10/17 0908    04/10/17 0907  sennosides-docusate sodium (SENOKOT-S) 8.6-50 MG tablet 2 tablet  2 Times Daily PRN      04/10/17 0908    04/10/17 0907   Basic Metabolic Panel  Once      04/10/17 0907    04/10/17 0907  Magnesium  Once      04/10/17 0907    04/10/17 0907  Phosphorus  Once      04/10/17 0907    04/10/17 0906  Place Sequential Compression Device  Once      04/10/17 0906    04/10/17 0906  Inpatient Consult to Access Center  Once     Provider:  (Not yet assigned)    04/10/17 0906    04/10/17 0900  potassium & sodium phosphates (PHOS-NAK) 280-160-250 MG packet - for Phosphorus less than 1.3 mg/dL  2 Times Daily      04/10/17 0355    04/10/17 0900  enoxaparin (LOVENOX) syringe 40 mg  Daily,   Status:  Discontinued      04/10/17 0355    04/10/17 0900  Cardiac Monitoring  Continuous      04/10/17 0901    04/10/17 0800  insulin aspart (novoLOG) injection 0-7 Units  4 Times Daily With Meals & Nightly      04/10/17 0355    04/10/17 0742  POC Glucose Fingerstick  Once      04/10/17 0741    04/10/17 0700  POC Glucose Fingerstick  4 Times Daily Before Meals & at Bedtime      04/10/17 0355    04/10/17 0622  POC Glucose Fingerstick  Once      04/10/17 0621    04/10/17 0600  CBC & Differential  Morning Draw      04/10/17 0355    04/10/17 0600  Comprehensive Metabolic Panel  Morning Draw      04/10/17 0355    04/10/17 0600  CBC Auto Differential  PROCEDURE ONCE      04/10/17 0355    04/10/17 0426  Urinalysis, Microscopic Only  Once      04/10/17 0425    04/10/17 0426  Urine Culture  Once      04/10/17 0425    04/10/17 0400  Lithium Level  Every 2 Hours      04/10/17 0315    04/10/17 0400  POC Glucose Fingerstick  Every 4 Hours      04/10/17 0355    04/10/17 0358  sodium chloride 0.9 % infusion  Continuous      04/10/17 0356    04/10/17 0356  Daily Weights  Daily      04/10/17 0355    04/10/17 0354  potassium chloride (MICRO-K) CR capsule 40 mEq  As Needed      04/10/17 0355    04/10/17 0354  potassium chloride (KLOR-CON) packet 40 mEq  As Needed,   Status:  Discontinued      04/10/17 0355    04/10/17 0354  potassium chloride 10 mEq in 100 mL IVPB  Every 1 Hour PRN       04/10/17 0355    04/10/17 0354  ondansetron (ZOFRAN) injection 4 mg  Every 6 Hours PRN      04/10/17 0355    04/10/17 0354  acetaminophen (TYLENOL) tablet 650 mg  Every 4 Hours PRN      04/10/17 0355    04/10/17 0354  VTE Risk Assessment - Moderate Risk  Once      04/10/17 0355    04/10/17 0354  Mechanical VTE Prophylaxis Not Indicated: Moderate VTE Risk With Pharmacologic Prophylaxis  Once      04/10/17 0355    04/10/17 0354  Diet Regular  Diet Effective Now,   Status:  Canceled      04/10/17 0355    04/10/17 0353  Full Code  Continuous,   Status:  Canceled      04/10/17 0355    04/10/17 0353  Vital Signs Per hospital policy  Per Hospital Policy      04/10/17 0355    04/10/17 0353  Cardiac Monitoring  Continuous      04/10/17 0355    04/10/17 0353  Continuous Pulse Oximetry  Continuous,   Status:  Canceled      04/10/17 0355    04/10/17 0353  Use Mobility Guidelines for Advancement of Activity  Until Discontinued      04/10/17 0355    04/10/17 0353  Follow [facility] Hypoglycemia Protocol For Blood Glucose Less Than 70 mg/dL  Until Discontinued      04/10/17 0355    04/10/17 0353  Hypoglycemia Treatment - Alert Patient That is Not NPO and Can Safely Swallow  Until Discontinued     Comments:  Administer 4 oz Fruit Juice OR 4 oz Regular Soda OR 8 oz Milk OR 15-30 grams (1 tube) of Glucose Gel  Recheck Blood Glucose 15 Minutes After Ingestion, Repeat Treatment & Continue to Recheck Blood Sugar Every 15 Minutes Until Blood Glucose is 70 or Higher  Once Blood Glucose is 70 or Higher, Give Snack (Peanut Butter & Crackers) if Next Meal Will Be Given in More Than 60 Minutes, Provide Meal Tray As Soon As Possible    04/10/17 0355    04/10/17 0353  Hypoglycemia Treatment - Patient Has IV Access - Unresponsive, NPO or Unable To Safely Swallow  Until Discontinued     Comments:  Administer 25g D50W IV Push (1 Whole Vial)  Recheck Blood Glucose 15 Minutes After Administration, if Blood Glucose Remains Less Than 70, Repeat Treatment    Recheck Blood Glucose 15 Minutes After 2nd Administration, if Blood Glucose Remains Less Than 70 After 2nd Dose of D50W Contact Provider for Further Treatment Orders & Consider Adding IVF With D5 for Maintenance    04/10/17 0355    04/10/17 0353  Hypoglycemia Treatment - Patient Without IV Access - Unresponsive, NPO or Unable To Safely Swallow  Until Discontinued     Comments:  Administer 1mg Glucagon SQ & Establish IV Access  Turn Patient on Side - Nausea / Vomiting May Occur  Recheck Blood Glucose 15 Minutes After Administration  If IV Access Has Not Been Established & Blood Glucose Remains Less Than 70, Repeat Treatment With Glucagon  If IV Access Established, Administer 25g D50W IV Push (1 Whole Vial)  Recheck Blood Glucose 15 Minutes After Administration of 2nd Medication, if Blood Glucose Remains Less Than 70, Contact Provider for Further Treatment Orders & Consider Adding IVF With D5 for Maintenance    04/10/17 0355    04/10/17 0353  Height and weight  Once      04/10/17 0355    04/10/17 0353  Intake and Output  Every Shift      04/10/17 0355    04/10/17 0353  If Patient has BG of < 80 and is symptomatic but not on an IV insulin protocol then use the Adult Hypoglycemia Treatment Orders.  Once      04/10/17 0355    04/10/17 0353  POC Glucose Fingerstick  Once     Comments:  On arrival to unit. If >140, call admitting MD for orders.    04/10/17 0355    04/10/17 0353  Tobacco cessation education  Once      04/10/17 0355    04/10/17 0353  Saline Lock  Continuous,   Status:  Canceled     Comments:  For standing ICU/CCU standing orders    04/10/17 0355    04/10/17 0353  Oxygen Therapy- Nasal Cannula; Titrate for SPO2: 90%, equal to or greater than  Continuous     Comments:  For unresponsiveness, acute dyspnea, cyanosis or suspected hypoxemia    04/10/17 0355    04/10/17 0353  Continuous Pulse Oximetry  Continuous,   Status:  Canceled     Comments:  For unresponsiveness, acute dyspnea, cyanosis or suspected  hypoxemia. Notify physician.    04/10/17 0355    04/10/17 0353  Nurse to Place Order for ECG 12-Lead if Patient Experiences Acute Chest Pain or Dysrhythmias  Continuous     Comments:  For standing ICU/CCU standing orders    04/10/17 0355    04/10/17 0353  ACLS Protocol For Life Threatening Dysrhythmias (If No DNR Order)  Continuous      04/10/17 0355    04/10/17 0353  Notify Provider if ACLS Protocol Activated  Once      04/10/17 0355    04/10/17 0353  Place Order to Consult Intensivist For Critical Care Management (If Patient Not Admitted to Cardiology for Primary Cardiology Condition)  Continuous     Comments:  For standing ICU/CCU standing orders    04/10/17 0355    04/10/17 0353  Notify All Physicians When Patient is Transferred  Once     Comments:  For standing ICU/CCU standing orders    04/10/17 0355    04/10/17 0353  PT Consult: Eval & Treat  Once,   Status:  Canceled      04/10/17 0355    04/10/17 0353  Insert Peripheral IV  Once      04/10/17 0355    04/10/17 0353  Saline Lock & Maintain IV Access  Continuous,   Status:  Canceled      04/10/17 0355    04/10/17 0352  sodium chloride 0.9 % flush 1-10 mL  As Needed      04/10/17 0355    04/10/17 0352  ipratropium-albuterol (DUO-NEB) nebulizer solution 3 mL  Every 2 Hours PRN      04/10/17 0355    04/10/17 0352  dextrose (GLUTOSE) oral gel 15 g  Every 15 Minutes PRN      04/10/17 0355    04/10/17 0352  dextrose (D50W) solution 25 g  Every 15 Minutes PRN      04/10/17 0355    04/10/17 0352  glucagon (human recombinant) (GLUCAGEN DIAGNOSTIC) injection 1 mg  Every 15 Minutes PRN      04/10/17 0355    04/10/17 0328  Laceration Repair  Once     Comments:  This order was created via procedure documentation    04/10/17 0327    04/10/17 0314  Inpatient Admission  Once      04/10/17 0313    04/10/17 0300  Pulmonology (on-call MD unless specified)  Once     Specialty:  Pulmonary Disease  Provider:  Wood Cheung MD    04/10/17 0259    04/10/17 0230  polyethylene glycol  with electrolytes (GOLYTELY) solution 1,000 mL  Every 1 Hour      04/10/17 0200    04/10/17 0159  Nasogastric Tube Insertion  Once     Comments:  To low wall suction    04/10/17 0200    04/10/17 0159  Legal Status 72 Hour Hold  Continuous      04/10/17 0200    04/10/17 0126  sodium chloride 0.9 % bolus 1,000 mL  Once      04/10/17 0124    04/10/17 0124  XR Chest 2 View  1 Time Imaging      04/10/17 0124    04/10/17 0124  CT Head Without Contrast  1 Time Imaging      04/10/17 0124    04/10/17 0124  Insert peripheral IV  Once      04/10/17 0124    04/10/17 0124  Cardiac Monitoring  Once      04/10/17 0124    04/10/17 0124  Continuous Pulse Oximetry  Continuous,   Status:  Canceled      04/10/17 0124    04/10/17 0124  XR Spine Cervical Complete 4 or 5 View  1 Time Imaging      04/10/17 0124    04/10/17 0123  sodium chloride 0.9 % flush 10 mL  As Needed      04/10/17 0124    04/10/17 0123  CBC & Differential  Once      04/10/17 0124    04/10/17 0123  Comprehensive Metabolic Panel  Once      04/10/17 0124    04/10/17 0123  Protime-INR  Once      04/10/17 0124    04/10/17 0123  aPTT  Once      04/10/17 0124    04/10/17 0123  Urinalysis With / Culture If Indicated  Once      04/10/17 0124    04/10/17 0123  Urine Drug Screen  Once      04/10/17 0124    04/10/17 0123  Ethanol  Once      04/10/17 0124    04/10/17 0123  Magnesium  Once      04/10/17 0124    04/10/17 0123  Acetaminophen Level  Once      04/10/17 0124    04/10/17 0123  Salicylate Level  Once      04/10/17 0124    04/10/17 0123  Lithium Level  Once      04/10/17 0124    04/10/17 0123  ECG 12 Lead  Once      04/10/17 0124    04/10/17 0123  CBC Auto Differential  PROCEDURE ONCE      04/10/17 0124    04/10/17 0107  Suicide Precautions  Continuous      04/10/17 0107    04/10/17 0107  Inpatient Consult to Access Center  Once     Provider:  (Not yet assigned)    04/10/17 0107    Unscheduled  Potassium  As Needed     Comments:  For sudden ventricular dysrhythmias.  "Notify physician.    04/10/17 0355    Unscheduled  Magnesium  As Needed     Comments:  For ICU/CCU Standing Orders    04/10/17 0355    Unscheduled  Potassium  As Needed     Comments:  For Ventricular Arrhythmias    04/10/17 0908    Unscheduled  Magnesium  As Needed     Comments:  For Ventricular Arrhythmias    04/10/17 0908    Unscheduled  Digoxin Level  As Needed     Comments:  For Atrial Arrhythmias    04/10/17 0908    Unscheduled  Blood Gas, Arterial  As Needed     Comments:  Per O2 Policy  Notify Physician    04/10/17 0908    --  lithium carbonate 150 MG capsule  2 Times Daily With Meals      04/10/17 0153    --  clonazePAM (KlonoPIN) 1 MG tablet  4 Times Daily Before Meals & Nightly PRN      04/10/17 0153    --  SCANNED - TELEMETRY        04/10/17 0000             Consult Notes (last 72 hours) (Notes from 4/8/2017  9:58 AM through 4/11/2017  9:58 AM)      Anca Dale LCSW at 4/10/2017  1:47 PM  Version 1 of 1     Consult Orders:    1. Inpatient Consult to [facility] [[id number]] ordered by Jean Miranda MD at 04/10/17 0906     2. Inpatient Consult to [facility] [[id number]] ordered by Virginia Clay MD at 04/10/17 0107                58yo   female presents to ED 6 overnight after an overdose of Lithium, ETOH and Klonopin. Pt states that she was upset that her son allows his wife (her daughter in law) to rule whether or not she gets to see her grandchildren. Pt states that she wants to see her grandchildren more but thinks that her daughter in law doesn't trust her with them mainly because they both have nut allergies and you have to carry epi-pen with you at all times. Pt says that she moved to [place] from [place] about three years ago and purchases a large home so that her son and daughter in law could live with her. She says since then they have all moved out and she couldn't afford the house. 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 Pt was in the [employer] for years and is service connected due to major depression and body dysmorphic disorder and anorexia. Pt thinks she is currently \"fat\" stating that \"my thighs are huge and my arms.\" Pt says she has no friends here in [place] as she is introverted. She says that all of her family and friends are in [place] and the only reason she moved to [place] was for her son. She names her main support person as her ex  now. She says he is remarried but that she is close to he and his wife.     At this point pt regrets her suicide attempt. She says that she immediately regretted it and called the police. When the paramedics arrived she fell backward and now has 5 staples in her head. Pt has a NG tube in place at present and that doesn't seem to be ready to come out. Pt is to be admitted to a telemetry bed for observation. Pt does not want her son to be notified that she is here. She says she plans to contact her ex  to let him know she is here only because she needs some clothes and has no one else. Pt is afraid that her daughter in law will keep her from her grandchildren more.    [facility]. She sees Rachana Quiñones, therapist at the [facility] and she said the psychiatrist name but it was so soft it could not be made out. Pt will follow up with them. A couple years ago she was in a bad place and was taken to the emergency room from her doctors office it seems and [facility]. Pt says she does not want to go back there as she says that it was awkward to be admitted when she knew all of the people working there. We talked about the possibility of needing to be admitted to a psychiatric unit again. At this point pt will need to be more medically stable for this. Apparently her blood sugar has been running low " but otherwise there is not much in the way of medical information available. The nurse is trying to get a hold of the physician at this point.     Access will follow. We are currently on diversion and do not anticipate any beds open today. Pt awaiting a medical bed from the ED at this point.     Electronically signed by Anca Dale LCSW at 4/10/2017  2:02 PM          The last recorded CIWA scores in 24 hrs.    Nausea and Vomitin-->no nausea and no vomiting (930)  Tremor: 0-->no tremor (930)  Paroxysmal Sweats: 0-->no sweat visible (930)  Anxiety: 0-->no anxiety, at ease (930)  Agitation: 0-->normal activity (930)  Tactile Disturbances: 0-->none (930)  Auditory Disturbances: 0-->not present (930)  Visual Disturbances: 0-->not present (930)  Headache, Fullness in Head: 4-->moderately severe (930)  Orientation and Clouding of Sensorium: 0-->oriented and can do serial additions (930)  Total CIWA-Ar Score:  [0-4] 4 (930)

## 2017-04-11 NOTE — PROGRESS NOTES
Dr. KAYLA Miranda    [address]    4/11/2017    Patient ID:  Name:  Jennifer Blankenship  MRN:  [id number]  1960  57 y.o.  female            CC/Reason for visit: Follow-up for overdose, suicide attempt    HPI: The patient has no new complaints.  She says she would like to take a shower.  She denies suicidal ideation at this time.  She was seen by psychiatry access center yesterday, but has not yet been seen today by them.  We are waiting for their evaluation.  She still has a sitter.    Vitals:  Vitals:    04/11/17 0524 04/11/17 0744 04/11/17 0745 04/11/17 1214   BP:   102/61 106/64   BP Location:   Left arm Left arm   Patient Position:   Lying Lying   Pulse:  77 86 69   Resp:   16 16   Temp:    98 °F (36.7 °C)   TempSrc:    Oral   SpO2:   95% 97%   Weight: 134 lb (60.8 kg)      Height:               Body mass index is 19.79 kg/(m^2).    Intake/Output Summary (Last 24 hours) at 04/11/17 1512  Last data filed at 04/11/17 0600   Gross per 24 hour   Intake             2885 ml   Output                0 ml   Net             2885 ml       Exam:  GEN:  No distress, appears stated age  EYES:   EOM-i, anicteric sclera bilat  ENT:    External ears/nose normal, OP clear  NECK:  Supple, midline trachea  LUNGS: Clear breath sounds bilat, nonlabored breathing  CV:  Normal S1S2, without murmur, no edema  ABD:  Non tender, no enlarged liver or masses  EXT:  No cyanosis or clubbing    Scheduled meds:    insulin aspart 0-7 Units Subcutaneous 4x Daily With Meals & Nightly   sennosides-docusate sodium 2 tablet Oral Nightly     IV meds:                        sodium chloride 175 mL/hr Last Rate: 175 mL/hr (04/11/17 1203)       Data Review:   I reviewed the patient's medications and new clinical results.  Lab Results   Component Value Date    CALCIUM 8.5 (L) 04/10/2017    PHOS 2.8 04/10/2017     Results from last 7 days  Lab Units 04/10/17  1015 04/10/17  0623 04/10/17  0209   AST (SGOT) U/L  --  27 28   MAGNESIUM mg/dL  2.1  --  2.1   SODIUM mmol/L 145 144 140   POTASSIUM mmol/L 4.4 4.2 3.6   CHLORIDE mmol/L 108* 107 100   TOTAL CO2 mmol/L 29.4* 29.6* 26.7   BUN mg/dL 15 17 20   CREATININE mg/dL 0.74 0.76 0.73   GLUCOSE mg/dL 84 94 110*   CALCIUM mg/dL 8.5* 8.8 9.3   WBC 10*3/mm3  --  4.22* 6.03   HEMOGLOBIN g/dL  --  12.9 13.0   PLATELETS 10*3/mm3  --  194 209   ALT (SGPT) U/L  --  27 28       ASSESSMENT:   Lithium overdose  Alcohol abuse  Depression    Suicide attempt by multiple drug overdose  Scalp laceration    PLAN:  Lithium levels have steadily declined.  The patient is medically stable for [facility].  If they do not feel she requires inpatient psychiatric treatment, then I will discharge her home if they certify she is mentally competent to leave the hospital.    Jean Miranda MD  4/11/2017

## 2017-04-12 VITALS
BODY MASS INDEX: 19.89 KG/M2 | TEMPERATURE: 97.9 F | HEIGHT: 69 IN | SYSTOLIC BLOOD PRESSURE: 117 MMHG | DIASTOLIC BLOOD PRESSURE: 66 MMHG | HEART RATE: 91 BPM | RESPIRATION RATE: 20 BRPM | OXYGEN SATURATION: 92 % | WEIGHT: 134.3 LBS

## 2017-04-12 LAB
GLUCOSE BLDC GLUCOMTR-MCNC: 103 MG/DL (ref 70–130)
GLUCOSE BLDC GLUCOMTR-MCNC: 86 MG/DL (ref 70–130)

## 2017-04-12 PROCEDURE — 82962 GLUCOSE BLOOD TEST: CPT

## 2017-04-12 RX ADMIN — ACETAMINOPHEN 650 MG: 325 TABLET ORAL at 11:42

## 2017-04-12 RX ADMIN — TRAMADOL HYDROCHLORIDE 50 MG: 50 TABLET, COATED ORAL at 09:39

## 2017-04-12 RX ADMIN — TRAMADOL HYDROCHLORIDE 50 MG: 50 TABLET, COATED ORAL at 00:01

## 2017-04-12 RX ADMIN — FLUTICASONE PROPIONATE 2 SPRAY: 50 SPRAY, METERED NASAL at 09:38

## 2017-04-12 NOTE — PLAN OF CARE
Problem: Fall Risk (Adult)  Goal: Absence of Falls  Outcome: Ongoing (interventions implemented as appropriate)    Problem: Suicide Risk (Adult,Obstetrics,Pediatric)  Goal: Strength-Based Wellness/Recovery  Outcome: Ongoing (interventions implemented as appropriate)    04/12/17 0330   Suicide Risk (Adult,Obstetrics,Pediatric)   Strength-Based Wellness/Recovery making progress toward outcome       Goal: Physical Safety  Outcome: Ongoing (interventions implemented as appropriate)    04/12/17 0330   Suicide Risk (Adult,Obstetrics,Pediatric)   Physical Safety making progress toward outcome         Problem: Overdose, Ingestion/Inhalants (Adult)  Goal: Signs and Symptoms of Listed Potential Problems Will be Absent or Manageable (Overdose, Ingestion/Inhalants)  Outcome: Ongoing (interventions implemented as appropriate)    04/12/17 0330   Overdose, Ingestion/Inhalants   Problems Assessed (Overdose) situational response;suicide risk   Problems Present (Overdose) suicide risk;situational response         Problem: Patient Care Overview (Adult)  Goal: Plan of Care Review  Outcome: Ongoing (interventions implemented as appropriate)    04/12/17 0330   Coping/Psychosocial Response Interventions   Plan Of Care Reviewed With patient   Patient Care Overview   Progress improving   Outcome Evaluation   Outcome Summary/Follow up Plan Slept all night, some headache, given pain meds and Tylenol for slight fever, sitter at bedside, for transfer to access today, will monitor       Goal: Adult Individualization and Mutuality  Outcome: Ongoing (interventions implemented as appropriate)    04/11/17 1753 04/12/17 0330   Individualization   Patient Specific Preferences likes to keep the lights off --    Patient Specific Goals --  relief of headache and fever   Patient Specific Interventions --  given pain med and Tylenol

## 2017-04-12 NOTE — DISCHARGE SUMMARY
Patient Identification:  Name: Jennifer Blankenship  Age: 57 y.o.  Sex: female  :  1960  MRN: [id number]  Primary Care Physician: No Known Provider    Admit date: 4/10/2017  Discharge date and time: 2017   Discharged Condition: good    Discharge Diagnoses:Principal Problem:    Suicide attempt by multiple drug overdose       Hospital Course: Jennifer Blankenship presented to [facility]  this patient presented with suicide attempt.  She had taken clonazepam and lithium, many pills.  She had also ingested alcohol.  She was observed on telemetry floor.  Her lithium levels were drawn every 4 hours for the first 18 hours, and the decreased rapidly.  She never required intensive care admission.  Her electrolytes were replaced accordingly.    She sustained a small laceration over her occipital area of her scalp.  This needs to be checked by her primary care physician within 3 days    She was seen by Dr. Skaggs, psychiatrist, and he stated that she is mentally competent and appropriate for discharge home today, and that she should follow-up with psychiatry at the [facility].    She was advised to follow-up with her primary care physician within 72 hours.  I'm stopping all her home medications.    Consults:   IP CONSULT TO ACCESS CENTER  IP CONSULT TO PULMONOLOGY  IP CONSULT TO [facility] CENTER  IP CONSULT TO [facility]ESS CENTER    Significant Diagnostic Studies:   CBC:   Results from last 7 days  Lab Units 04/10/17  0623   WBC 10*3/mm3 4.22*   RBC 10*6/mm3 4.32     BMP:   Results from last 7 days  Lab Units 04/10/17  1015   GLUCOSE mg/dL 84   TOTAL CO2 mmol/L 29.4*   BUN mg/dL 15   CREATININE mg/dL 0.74   CALCIUM mg/dL 8.5*     Coagulation:   Lab Results   Component Value Date    INR 0.95 04/10/2017     Cardiac markers:     ABGs:       Invalid input(s): PO2, PCO2  Radiology review:   Imaging Results (most recent)     Procedure Component Value Units Date/Time    CT Head Without Contrast [[id number]]  Collected:  04/10/17 0634     Updated:  04/10/17 2101    Narrative:       CT SCAN OF THE BRAIN WITHOUT CONTRAST     HISTORY: Fell with posterior head trauma. Headache.     FINDINGS: The CT scan was performed as an emergency procedure through  the brain without contrast. The ventricles are normal in size and  midline. There is no evidence of intracranial hemorrhage or mass effect  and the visualized sinuses are clear.     This report was finalized on 4/10/2017 8:58 PM by Dr. Jay Armando MD.       XR Chest 2 View [[id number]] Collected:  04/10/17 0624     Updated:  04/10/17 2101    Narrative:       2-VIEW CHEST     HISTORY: Overdose. Fell. Chest pain.     FINDINGS: The lungs are well-expanded and clear and the heart size is  normal. There is no acute disease.     This report was finalized on 4/10/2017 8:58 PM by Dr. Jay Armando MD.       XR Spine Cervical Complete 4 or 5 View [[id number]] Collected:  04/10/17 0626     Updated:  04/10/17 2102    Narrative:       5-VIEW CERVICAL SPINE     HISTORY: Fell. Neck pain.     FINDINGS: There is severe disc space narrowing and spurring at C4-5,  C5-6, and C6-7. There is localized reversal of the cervical lordosis  centered at C4-5 which may relate to the degenerative change versus an  element of muscle spasm. No acute fracture is seen. There is some bony  foraminal encroachment, particularly at C6-7 bilaterally.     This report was finalized on 4/10/2017 8:59 PM by Dr. Jay Armando MD.             Discharge Exam:  Alert and oriented x 4, in NAD  Supple neck, midline trach  RRR, no m/r/g, no edema  Clear bilaterally, no wheezing, nonlabored  No clubbing or cyanosis     Disposition:  Home    Patient Instructions:   There are no discharge medications for this patient.       Medication Reconciliation: Please see electronically completed Med Rec.    Total time spent discharging patient less than 30 minutes      Signed:  Jean Miranda MD  4/12/2017  3:37 PM

## 2017-04-12 NOTE — PLAN OF CARE
Problem: Fall Risk (Adult)  Goal: Absence of Falls  Outcome: Ongoing (interventions implemented as appropriate)    Problem: Suicide Risk (Adult,Obstetrics,Pediatric)  Goal: Strength-Based Wellness/Recovery  Outcome: Ongoing (interventions implemented as appropriate)  Goal: Physical Safety  Outcome: Ongoing (interventions implemented as appropriate)    Problem: Overdose, Ingestion/Inhalants (Adult)  Goal: Signs and Symptoms of Listed Potential Problems Will be Absent or Manageable (Overdose, Ingestion/Inhalants)  Outcome: Ongoing (interventions implemented as appropriate)    Problem: Patient Care Overview (Adult)  Goal: Plan of Care Review  Outcome: Ongoing (interventions implemented as appropriate)    04/12/17 9026   Coping/Psychosocial Response Interventions   Plan Of Care Reviewed With patient   Patient Care Overview   Progress improving   Outcome Evaluation   Outcome Summary/Follow up Plan med for headache with good results, states she feels good today, no SI, cleared for d/c by Dr. Hartmann, and admitting MD       Goal: Adult Individualization and Mutuality  Outcome: Ongoing (interventions implemented as appropriate)

## 2017-04-12 NOTE — PSYCH
PSYCHIATRIC EVALUATION    IDENTIFYING INFORMATION:  The patient is a 57-year-old white female admitted after an ingestion of lithium and Klonopin.     CHIEF COMPLAINT: None given. Informant: Patient. Patient reliability: Good.     HISTORY OF PRESENT ILLNESS: The patient is a 57-year-old  white female, admitted on 04/10/2017 following an ingestion of alcohol, lithium and Klonopin.  The patient's reports multiple stressors in her life right now, mainly related to contentious relationship with her son and daughter-in-law. She also reports significant work stress as well as recently having had to file for bankruptcy. [facility] and is prescribed lithium carbonate for a history of bipolar disorder. She also suffers from body dysmorphic disorder and anorexia. The patient reports great contrition over events leading to hospitalization and called for help almost immediately after her overdose. She reports no previous suicide attempt. She was last hospitalized approximately 2 years ago at the [facility] here in [place].  The patient currently denies suicidal or homicidal ideation and expresses appropriate contrition over the events leading to hospitalization.     PAST PSYCHIATRIC HISTORY: As noted previously, the patient has been diagnosed with bipolar disorder. She does report a history of hypomanic episode. She also reports a history of diagnosis with anorexia and body dysmorphic disorder.     PAST MEDICAL HISTORY:  1.  Diabetes mellitus.   2.  Environmental allergies.     MEDICATIONS:  1.  Flonase.  2.  NovoLog.  3.  Lithium carbonate.  4.  Klonopin.     ALLERGIES: No known drug allergies.     FAMILY HISTORY: Noncontributory.     SOCIAL HISTORY: The patient lives alone. 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 She is a  of the [employer] and is considered disabled, service-connected.  She reports occasional use of alcohol. She  denies use of other psychoactive substances.      MENTAL STATUS EXAMINATION: At this time, reveals the patient to be a thin white female appearing her stated age. She is in no apparent physical distress at the time of examination. She is awake, alert, and oriented in all spheres.  Her mood is euthymic. Her affect is full range. Speech is generally relevant and coherent. There are no gross deficits in memory or cognition noted. Intelligence is judged to be in the average based on fund of knowledge. The patient is cooperative throughout the interview. She is currently denying suicidal or homicidal features. Judgment and insight appear intact. No signs or symptoms of withdrawal are noted.      Patient assets: Motivation for change, supportive family and friends.  Liabilities: Health issues.      DIAGNOSTIC IMPRESSION:  1.  Bipolar disorder, most recent episode depressed.  2.  Body dysmorphic disorder by history.  3.  Anorexia by history.  4.  Status post ingestion of lithium, Klonopin, and alcohol without sequelae.     RECOMMENDATIONS: At this point, the patient has good support and is followed by a psychiatrist at the [facility]. She is appropriately contrite over events leading to hospitalization, and it is my opinion that she is not in need of psychiatric hospitalization at this time. Accordingly, she is from a psychiatric standpoint stable for discharge once medically stabilized.        Cristi Hartmann M.D.  YANN:Cadence  D:   04/12/2017 10:42:40  T:   04/12/2017 15:33:08  Job ID:   [id number]  Document ID:   [id number]  cc:

## 2017-05-23 ENCOUNTER — HOSPITAL ENCOUNTER (OUTPATIENT)
Facility: HOSPITAL | Age: 57
Setting detail: OBSERVATION
End: 2017-05-24
Attending: EMERGENCY MEDICINE | Admitting: HOSPITALIST

## 2017-05-23 DIAGNOSIS — R45.851 SUICIDAL IDEATION: ICD-10-CM

## 2017-05-23 DIAGNOSIS — T50.902A POLYSUBSTANCE OVERDOSE, INTENTIONAL SELF-HARM, INITIAL ENCOUNTER (HCC): Primary | ICD-10-CM

## 2017-05-23 DIAGNOSIS — F10.920 ALCOHOL INTOXICATION, UNCOMPLICATED (HCC): ICD-10-CM

## 2017-05-23 PROBLEM — T50.901A POLYSUBSTANCE OVERDOSE: Status: ACTIVE | Noted: 2017-05-23

## 2017-05-23 PROBLEM — E87.20 ACIDOSIS, METABOLIC: Status: ACTIVE | Noted: 2017-05-23

## 2017-05-23 PROBLEM — F10.129 ALCOHOL ABUSE WITH INTOXICATION (HCC): Status: ACTIVE | Noted: 2017-05-23

## 2017-05-23 PROBLEM — F41.9 ANXIETY DISORDER: Status: ACTIVE | Noted: 2017-05-23

## 2017-05-23 PROBLEM — F32.9 DEPRESSION, MAJOR: Status: ACTIVE | Noted: 2017-05-23

## 2017-05-23 LAB
ALBUMIN SERPL-MCNC: 4.1 G/DL (ref 3.5–5.2)
ALBUMIN/GLOB SERPL: 1.3 G/DL
ALP SERPL-CCNC: 97 U/L (ref 39–117)
ALT SERPL W P-5'-P-CCNC: 15 U/L (ref 1–33)
AMPHET+METHAMPHET UR QL: NEGATIVE
ANION GAP SERPL CALCULATED.3IONS-SCNC: 16.3 MMOL/L
APAP SERPL-MCNC: <5 MCG/ML (ref 10–30)
AST SERPL-CCNC: 26 U/L (ref 1–32)
BARBITURATES UR QL SCN: NEGATIVE
BASOPHILS # BLD AUTO: 0.02 10*3/MM3 (ref 0–0.2)
BASOPHILS NFR BLD AUTO: 0.3 % (ref 0–1.5)
BENZODIAZ UR QL SCN: NEGATIVE
BILIRUB SERPL-MCNC: <0.2 MG/DL (ref 0.1–1.2)
BUN BLD-MCNC: 20 MG/DL (ref 6–20)
BUN/CREAT SERPL: 24.1 (ref 7–25)
CALCIUM SPEC-SCNC: 9.1 MG/DL (ref 8.6–10.5)
CANNABINOIDS SERPL QL: NEGATIVE
CHLORIDE SERPL-SCNC: 101 MMOL/L (ref 98–107)
CO2 SERPL-SCNC: 19.7 MMOL/L (ref 22–29)
COCAINE UR QL: NEGATIVE
CREAT BLD-MCNC: 0.83 MG/DL (ref 0.57–1)
DEPRECATED RDW RBC AUTO: 42.1 FL (ref 37–54)
EOSINOPHIL # BLD AUTO: 0.21 10*3/MM3 (ref 0–0.7)
EOSINOPHIL NFR BLD AUTO: 2.8 % (ref 0.3–6.2)
ERYTHROCYTE [DISTWIDTH] IN BLOOD BY AUTOMATED COUNT: 12.5 % (ref 11.7–13)
ETHANOL BLD-MCNC: 119 MG/DL (ref 0–10)
ETHANOL UR QL: 0.12 %
GFR SERPL CREATININE-BSD FRML MDRD: 70 ML/MIN/1.73
GLOBULIN UR ELPH-MCNC: 3.1 GM/DL
GLUCOSE BLD-MCNC: 103 MG/DL (ref 65–99)
HCT VFR BLD AUTO: 43.1 % (ref 35.6–45.5)
HGB BLD-MCNC: 14.1 G/DL (ref 11.9–15.5)
IMM GRANULOCYTES # BLD: 0 10*3/MM3 (ref 0–0.03)
IMM GRANULOCYTES NFR BLD: 0 % (ref 0–0.5)
LYMPHOCYTES # BLD AUTO: 3.25 10*3/MM3 (ref 0.9–4.8)
LYMPHOCYTES NFR BLD AUTO: 43.3 % (ref 19.6–45.3)
MCH RBC QN AUTO: 30.3 PG (ref 26.9–32)
MCHC RBC AUTO-ENTMCNC: 32.7 G/DL (ref 32.4–36.3)
MCV RBC AUTO: 92.7 FL (ref 80.5–98.2)
METHADONE UR QL SCN: NEGATIVE
MONOCYTES # BLD AUTO: 0.77 10*3/MM3 (ref 0.2–1.2)
MONOCYTES NFR BLD AUTO: 10.3 % (ref 5–12)
NEUTROPHILS # BLD AUTO: 3.25 10*3/MM3 (ref 1.9–8.1)
NEUTROPHILS NFR BLD AUTO: 43.3 % (ref 42.7–76)
OPIATES UR QL: NEGATIVE
OXYCODONE UR QL SCN: NEGATIVE
PLATELET # BLD AUTO: 243 10*3/MM3 (ref 140–500)
PMV BLD AUTO: 10.3 FL (ref 6–12)
POTASSIUM BLD-SCNC: 3.9 MMOL/L (ref 3.5–5.2)
PROT SERPL-MCNC: 7.2 G/DL (ref 6–8.5)
RBC # BLD AUTO: 4.65 10*6/MM3 (ref 3.9–5.2)
SALICYLATES SERPL-MCNC: <0.3 MG/DL
SODIUM BLD-SCNC: 137 MMOL/L (ref 136–145)
WBC NRBC COR # BLD: 7.5 10*3/MM3 (ref 4.5–10.7)

## 2017-05-23 PROCEDURE — 93005 ELECTROCARDIOGRAM TRACING: CPT | Performed by: EMERGENCY MEDICINE

## 2017-05-23 PROCEDURE — 25010000002 ENOXAPARIN PER 10 MG: Performed by: INTERNAL MEDICINE

## 2017-05-23 PROCEDURE — 80307 DRUG TEST PRSMV CHEM ANLYZR: CPT | Performed by: EMERGENCY MEDICINE

## 2017-05-23 PROCEDURE — 96361 HYDRATE IV INFUSION ADD-ON: CPT

## 2017-05-23 PROCEDURE — G0378 HOSPITAL OBSERVATION PER HR: HCPCS

## 2017-05-23 PROCEDURE — 85025 COMPLETE CBC W/AUTO DIFF WBC: CPT | Performed by: EMERGENCY MEDICINE

## 2017-05-23 PROCEDURE — 99285 EMERGENCY DEPT VISIT HI MDM: CPT

## 2017-05-23 PROCEDURE — 96360 HYDRATION IV INFUSION INIT: CPT

## 2017-05-23 PROCEDURE — 80053 COMPREHEN METABOLIC PANEL: CPT | Performed by: EMERGENCY MEDICINE

## 2017-05-23 PROCEDURE — 96372 THER/PROPH/DIAG INJ SC/IM: CPT

## 2017-05-23 PROCEDURE — 93010 ELECTROCARDIOGRAM REPORT: CPT | Performed by: INTERNAL MEDICINE

## 2017-05-23 RX ORDER — SODIUM CHLORIDE 9 MG/ML
100 INJECTION, SOLUTION INTRAVENOUS CONTINUOUS
Status: DISCONTINUED | OUTPATIENT
Start: 2017-05-23 | End: 2017-05-24 | Stop reason: HOSPADM

## 2017-05-23 RX ORDER — ONDANSETRON 4 MG/1
4 TABLET, FILM COATED ORAL EVERY 6 HOURS PRN
Status: DISCONTINUED | OUTPATIENT
Start: 2017-05-23 | End: 2017-05-24 | Stop reason: HOSPADM

## 2017-05-23 RX ORDER — SERTRALINE HYDROCHLORIDE 100 MG/1
100 TABLET, FILM COATED ORAL DAILY
Status: DISCONTINUED | OUTPATIENT
Start: 2017-05-23 | End: 2017-05-24 | Stop reason: HOSPADM

## 2017-05-23 RX ORDER — METHYLPHENIDATE HYDROCHLORIDE 27 MG/1
27 TABLET ORAL EVERY MORNING
COMMUNITY
End: 2017-05-24 | Stop reason: HOSPADM

## 2017-05-23 RX ORDER — ACTIVATED CHARCOAL 25 G
50 SUSPENSION, RECONSTITUTED, ORAL (EA) ORAL ONCE
Status: COMPLETED | OUTPATIENT
Start: 2017-05-23 | End: 2017-05-23

## 2017-05-23 RX ORDER — SODIUM CHLORIDE 0.9 % (FLUSH) 0.9 %
1-10 SYRINGE (ML) INJECTION AS NEEDED
Status: DISCONTINUED | OUTPATIENT
Start: 2017-05-23 | End: 2017-05-24 | Stop reason: HOSPADM

## 2017-05-23 RX ORDER — CLONAZEPAM 2 MG/1
2 TABLET ORAL 3 TIMES DAILY
COMMUNITY
End: 2017-05-24 | Stop reason: HOSPADM

## 2017-05-23 RX ORDER — ONDANSETRON 2 MG/ML
4 INJECTION INTRAMUSCULAR; INTRAVENOUS EVERY 6 HOURS PRN
Status: DISCONTINUED | OUTPATIENT
Start: 2017-05-23 | End: 2017-05-24 | Stop reason: HOSPADM

## 2017-05-23 RX ORDER — ACETAMINOPHEN 325 MG/1
650 TABLET ORAL EVERY 4 HOURS PRN
Status: DISCONTINUED | OUTPATIENT
Start: 2017-05-23 | End: 2017-05-24 | Stop reason: HOSPADM

## 2017-05-23 RX ORDER — SODIUM CHLORIDE 9 MG/ML
125 INJECTION, SOLUTION INTRAVENOUS CONTINUOUS
Status: DISCONTINUED | OUTPATIENT
Start: 2017-05-23 | End: 2017-05-23

## 2017-05-23 RX ORDER — CLONAZEPAM 1 MG/1
1 TABLET ORAL EVERY 8 HOURS SCHEDULED
Status: DISCONTINUED | OUTPATIENT
Start: 2017-05-23 | End: 2017-05-24 | Stop reason: HOSPADM

## 2017-05-23 RX ORDER — ONDANSETRON 4 MG/1
4 TABLET, ORALLY DISINTEGRATING ORAL EVERY 6 HOURS PRN
Status: DISCONTINUED | OUTPATIENT
Start: 2017-05-23 | End: 2017-05-24 | Stop reason: HOSPADM

## 2017-05-23 RX ORDER — PROPRANOLOL HYDROCHLORIDE 40 MG/1
40 TABLET ORAL AS NEEDED
COMMUNITY
End: 2017-05-24 | Stop reason: HOSPADM

## 2017-05-23 RX ORDER — SODIUM CHLORIDE 0.9 % (FLUSH) 0.9 %
10 SYRINGE (ML) INJECTION AS NEEDED
Status: DISCONTINUED | OUTPATIENT
Start: 2017-05-23 | End: 2017-05-24 | Stop reason: HOSPADM

## 2017-05-23 RX ADMIN — SODIUM CHLORIDE 1000 ML: 9 INJECTION, SOLUTION INTRAVENOUS at 04:39

## 2017-05-23 RX ADMIN — ACTIVATED CHARCOAL 50000 MG: 25 PELLET ORAL at 04:22

## 2017-05-23 RX ADMIN — ACETAMINOPHEN 650 MG: 325 TABLET ORAL at 15:29

## 2017-05-23 RX ADMIN — SERTRALINE 100 MG: 100 TABLET, FILM COATED ORAL at 10:09

## 2017-05-23 RX ADMIN — ACETAMINOPHEN 650 MG: 325 TABLET ORAL at 08:28

## 2017-05-23 RX ADMIN — CLONAZEPAM 1 MG: 1 TABLET ORAL at 15:29

## 2017-05-23 RX ADMIN — ENOXAPARIN SODIUM 40 MG: 40 INJECTION SUBCUTANEOUS at 15:29

## 2017-05-23 RX ADMIN — SODIUM CHLORIDE 125 ML/HR: 9 INJECTION, SOLUTION INTRAVENOUS at 08:27

## 2017-05-23 RX ADMIN — SODIUM CHLORIDE 100 ML/HR: 9 INJECTION, SOLUTION INTRAVENOUS at 15:29

## 2017-05-24 ENCOUNTER — HOSPITAL ENCOUNTER (INPATIENT)
Facility: HOSPITAL | Age: 57
LOS: 2 days | Discharge: HOME | End: 2017-05-26
Attending: SPECIALIST | Admitting: SPECIALIST

## 2017-05-24 VITALS
HEIGHT: 69 IN | WEIGHT: 125 LBS | TEMPERATURE: 97.6 F | HEART RATE: 77 BPM | OXYGEN SATURATION: 96 % | DIASTOLIC BLOOD PRESSURE: 64 MMHG | BODY MASS INDEX: 18.51 KG/M2 | RESPIRATION RATE: 16 BRPM | SYSTOLIC BLOOD PRESSURE: 116 MMHG

## 2017-05-24 PROBLEM — E87.20 ACIDOSIS, METABOLIC: Status: RESOLVED | Noted: 2017-05-23 | Resolved: 2017-05-24

## 2017-05-24 PROBLEM — F33.9 RECURRENT MAJOR DEPRESSION (HCC): Status: ACTIVE | Noted: 2017-05-24

## 2017-05-24 LAB
ANION GAP SERPL CALCULATED.3IONS-SCNC: 11.4 MMOL/L
BUN BLD-MCNC: 13 MG/DL (ref 6–20)
BUN/CREAT SERPL: 15.1 (ref 7–25)
CALCIUM SPEC-SCNC: 9.2 MG/DL (ref 8.6–10.5)
CHLORIDE SERPL-SCNC: 106 MMOL/L (ref 98–107)
CO2 SERPL-SCNC: 24.6 MMOL/L (ref 22–29)
CREAT BLD-MCNC: 0.86 MG/DL (ref 0.57–1)
DEPRECATED RDW RBC AUTO: 43.9 FL (ref 37–54)
ERYTHROCYTE [DISTWIDTH] IN BLOOD BY AUTOMATED COUNT: 12.9 % (ref 11.7–13)
GFR SERPL CREATININE-BSD FRML MDRD: 68 ML/MIN/1.73
GLUCOSE BLD-MCNC: 103 MG/DL (ref 65–99)
HCT VFR BLD AUTO: 39.6 % (ref 35.6–45.5)
HGB BLD-MCNC: 12.5 G/DL (ref 11.9–15.5)
MCH RBC QN AUTO: 29.6 PG (ref 26.9–32)
MCHC RBC AUTO-ENTMCNC: 31.6 G/DL (ref 32.4–36.3)
MCV RBC AUTO: 93.8 FL (ref 80.5–98.2)
PLATELET # BLD AUTO: 196 10*3/MM3 (ref 140–500)
PMV BLD AUTO: 10.2 FL (ref 6–12)
POTASSIUM BLD-SCNC: 4.4 MMOL/L (ref 3.5–5.2)
RBC # BLD AUTO: 4.22 10*6/MM3 (ref 3.9–5.2)
SODIUM BLD-SCNC: 142 MMOL/L (ref 136–145)
WBC NRBC COR # BLD: 5.23 10*3/MM3 (ref 4.5–10.7)

## 2017-05-24 PROCEDURE — G0378 HOSPITAL OBSERVATION PER HR: HCPCS

## 2017-05-24 PROCEDURE — 96361 HYDRATE IV INFUSION ADD-ON: CPT

## 2017-05-24 PROCEDURE — 93005 ELECTROCARDIOGRAM TRACING: CPT | Performed by: HOSPITALIST

## 2017-05-24 PROCEDURE — 93010 ELECTROCARDIOGRAM REPORT: CPT | Performed by: INTERNAL MEDICINE

## 2017-05-24 PROCEDURE — 85027 COMPLETE CBC AUTOMATED: CPT | Performed by: HOSPITALIST

## 2017-05-24 PROCEDURE — 80048 BASIC METABOLIC PNL TOTAL CA: CPT | Performed by: HOSPITALIST

## 2017-05-24 RX ORDER — CLONAZEPAM 1 MG/1
1 TABLET ORAL EVERY 8 HOURS SCHEDULED
Refills: 0
Start: 2017-05-24 | End: 2017-06-03

## 2017-05-24 RX ORDER — LORAZEPAM 1 MG/1
2 TABLET ORAL EVERY 4 HOURS PRN
Status: DISCONTINUED | OUTPATIENT
Start: 2017-05-25 | End: 2017-05-25

## 2017-05-24 RX ORDER — LORAZEPAM 1 MG/1
1 TABLET ORAL EVERY 4 HOURS PRN
Status: DISCONTINUED | OUTPATIENT
Start: 2017-05-27 | End: 2017-05-25

## 2017-05-24 RX ORDER — SERTRALINE HYDROCHLORIDE 100 MG/1
100 TABLET, FILM COATED ORAL DAILY
Start: 2017-05-24

## 2017-05-24 RX ORDER — MAGNESIUM HYDROXIDE/ALUMINUM HYDROXICE/SIMETHICONE 120; 1200; 1200 MG/30ML; MG/30ML; MG/30ML
30 SUSPENSION ORAL EVERY 6 HOURS PRN
Status: DISCONTINUED | OUTPATIENT
Start: 2017-05-24 | End: 2017-05-26 | Stop reason: HOSPADM

## 2017-05-24 RX ORDER — LORAZEPAM 0.5 MG/1
0.5 TABLET ORAL
Status: DISCONTINUED | OUTPATIENT
Start: 2017-05-28 | End: 2017-05-25

## 2017-05-24 RX ORDER — LORAZEPAM 1 MG/1
1 TABLET ORAL
Status: DISCONTINUED | OUTPATIENT
Start: 2017-05-27 | End: 2017-05-25

## 2017-05-24 RX ORDER — LORAZEPAM 1 MG/1
2 TABLET ORAL EVERY 4 HOURS PRN
Status: DISCONTINUED | OUTPATIENT
Start: 2017-05-24 | End: 2017-05-25

## 2017-05-24 RX ORDER — ACETAMINOPHEN 325 MG/1
650 TABLET ORAL EVERY 4 HOURS PRN
Refills: 0
Start: 2017-05-24 | End: 2017-05-26 | Stop reason: HOSPADM

## 2017-05-24 RX ORDER — ACETAMINOPHEN 325 MG/1
650 TABLET ORAL EVERY 4 HOURS PRN
Status: DISCONTINUED | OUTPATIENT
Start: 2017-05-24 | End: 2017-05-26 | Stop reason: HOSPADM

## 2017-05-24 RX ORDER — LORAZEPAM 0.5 MG/1
0.5 TABLET ORAL EVERY 4 HOURS PRN
Status: DISCONTINUED | OUTPATIENT
Start: 2017-05-28 | End: 2017-05-25

## 2017-05-24 RX ORDER — LORAZEPAM 1 MG/1
2 TABLET ORAL
Status: DISCONTINUED | OUTPATIENT
Start: 2017-05-25 | End: 2017-05-25

## 2017-05-24 RX ADMIN — CLONAZEPAM 1 MG: 1 TABLET ORAL at 05:37

## 2017-05-24 RX ADMIN — SODIUM CHLORIDE 100 ML/HR: 9 INJECTION, SOLUTION INTRAVENOUS at 11:52

## 2017-05-24 RX ADMIN — ACETAMINOPHEN 650 MG: 325 TABLET ORAL at 18:22

## 2017-05-24 RX ADMIN — SODIUM CHLORIDE 100 ML/HR: 9 INJECTION, SOLUTION INTRAVENOUS at 01:52

## 2017-05-24 RX ADMIN — CLONAZEPAM 1 MG: 1 TABLET ORAL at 13:12

## 2017-05-24 RX ADMIN — SERTRALINE 100 MG: 100 TABLET, FILM COATED ORAL at 07:57

## 2017-05-24 RX ADMIN — ACETAMINOPHEN 650 MG: 325 TABLET ORAL at 07:57

## 2017-05-25 RX ORDER — SERTRALINE HYDROCHLORIDE 100 MG/1
100 TABLET, FILM COATED ORAL DAILY
Status: DISCONTINUED | OUTPATIENT
Start: 2017-05-25 | End: 2017-05-26 | Stop reason: HOSPADM

## 2017-05-25 RX ORDER — LORAZEPAM 1 MG/1
2 TABLET ORAL EVERY 4 HOURS PRN
Status: DISCONTINUED | OUTPATIENT
Start: 2017-05-27 | End: 2017-05-25

## 2017-05-25 RX ORDER — LORAZEPAM 1 MG/1
2 TABLET ORAL EVERY 4 HOURS PRN
Status: DISCONTINUED | OUTPATIENT
Start: 2017-05-25 | End: 2017-05-26 | Stop reason: HOSPADM

## 2017-05-25 RX ADMIN — SERTRALINE 100 MG: 100 TABLET, FILM COATED ORAL at 11:59

## 2017-05-25 RX ADMIN — LORAZEPAM 2 MG: 1 TABLET ORAL at 16:04

## 2017-05-25 RX ADMIN — ACETAMINOPHEN 650 MG: 325 TABLET ORAL at 03:19

## 2017-05-26 VITALS
OXYGEN SATURATION: 97 % | SYSTOLIC BLOOD PRESSURE: 122 MMHG | TEMPERATURE: 97.6 F | WEIGHT: 121.2 LBS | BODY MASS INDEX: 17.9 KG/M2 | DIASTOLIC BLOOD PRESSURE: 71 MMHG | HEART RATE: 78 BPM | RESPIRATION RATE: 18 BRPM

## 2017-05-26 RX ADMIN — SERTRALINE 100 MG: 100 TABLET, FILM COATED ORAL at 12:42
